# Patient Record
Sex: MALE | Race: WHITE | Employment: OTHER | URBAN - METROPOLITAN AREA
[De-identification: names, ages, dates, MRNs, and addresses within clinical notes are randomized per-mention and may not be internally consistent; named-entity substitution may affect disease eponyms.]

---

## 2022-01-01 ENCOUNTER — APPOINTMENT (OUTPATIENT)
Dept: GENERAL RADIOLOGY | Age: 82
DRG: 870 | End: 2022-01-01
Attending: INTERNAL MEDICINE
Payer: MEDICARE

## 2022-01-01 ENCOUNTER — APPOINTMENT (OUTPATIENT)
Dept: CT IMAGING | Age: 82
DRG: 870 | End: 2022-01-01
Attending: INTERNAL MEDICINE
Payer: MEDICARE

## 2022-01-01 ENCOUNTER — HOSPITAL ENCOUNTER (INPATIENT)
Age: 82
LOS: 9 days | DRG: 870 | End: 2022-08-13
Attending: INTERNAL MEDICINE | Admitting: INTERNAL MEDICINE
Payer: MEDICARE

## 2022-01-01 VITALS
HEIGHT: 69 IN | TEMPERATURE: 99.3 F | DIASTOLIC BLOOD PRESSURE: 46 MMHG | OXYGEN SATURATION: 23 % | BODY MASS INDEX: 21.71 KG/M2 | SYSTOLIC BLOOD PRESSURE: 75 MMHG | WEIGHT: 146.61 LBS

## 2022-01-01 DIAGNOSIS — J18.9 PNEUMONIA DUE TO INFECTIOUS ORGANISM, UNSPECIFIED LATERALITY, UNSPECIFIED PART OF LUNG: ICD-10-CM

## 2022-01-01 LAB
A/G RATIO: 0.9 (ref 1.1–2.2)
A/G RATIO: 0.9 (ref 1.1–2.2)
ABO/RH: NORMAL
ACETAMINOPHEN LEVEL: <5 UG/ML (ref 10–30)
ALBUMIN SERPL-MCNC: 2.1 G/DL (ref 3.4–5)
ALBUMIN SERPL-MCNC: 2.3 G/DL (ref 3.4–5)
ALBUMIN SERPL-MCNC: 2.4 G/DL (ref 3.4–5)
ALBUMIN SERPL-MCNC: 2.6 G/DL (ref 3.4–5)
ALBUMIN SERPL-MCNC: 2.8 G/DL (ref 3.4–5)
ALP BLD-CCNC: 38 U/L (ref 40–129)
ALP BLD-CCNC: 41 U/L (ref 40–129)
ALP BLD-CCNC: 45 U/L (ref 40–129)
ALT SERPL-CCNC: 38 U/L (ref 10–40)
ALT SERPL-CCNC: 39 U/L (ref 10–40)
ALT SERPL-CCNC: 44 U/L (ref 10–40)
ANION GAP SERPL CALCULATED.3IONS-SCNC: 11 MMOL/L (ref 3–16)
ANION GAP SERPL CALCULATED.3IONS-SCNC: 14 MMOL/L (ref 3–16)
ANION GAP SERPL CALCULATED.3IONS-SCNC: 7 MMOL/L (ref 3–16)
ANION GAP SERPL CALCULATED.3IONS-SCNC: 8 MMOL/L (ref 3–16)
ANION GAP SERPL CALCULATED.3IONS-SCNC: 8 MMOL/L (ref 3–16)
ANION GAP SERPL CALCULATED.3IONS-SCNC: 9 MMOL/L (ref 3–16)
ANTIBODY SCREEN: NORMAL
APPEARANCE BAL (LAVAGE): ABNORMAL
APTT: 166.2 SEC (ref 23–34.3)
APTT: 60 SEC (ref 23–34.3)
APTT: 78.2 SEC (ref 23–34.3)
APTT: 82.6 SEC (ref 23–34.3)
APTT: >180 SEC (ref 23–34.3)
AST SERPL-CCNC: 44 U/L (ref 15–37)
AST SERPL-CCNC: 63 U/L (ref 15–37)
AST SERPL-CCNC: 80 U/L (ref 15–37)
BACTERIA: NORMAL /HPF
BASE EXCESS ARTERIAL: -0.4 MMOL/L (ref -3–3)
BASE EXCESS ARTERIAL: -0.9 MMOL/L (ref -3–3)
BASE EXCESS ARTERIAL: -1.6 MMOL/L (ref -3–3)
BASE EXCESS ARTERIAL: -2.1 MMOL/L (ref -3–3)
BASOPHILS ABSOLUTE: 0 K/UL (ref 0–0.2)
BASOPHILS RELATIVE PERCENT: 0.1 %
BASOPHILS RELATIVE PERCENT: 0.2 %
BILIRUB SERPL-MCNC: 0.4 MG/DL (ref 0–1)
BILIRUB SERPL-MCNC: 0.4 MG/DL (ref 0–1)
BILIRUB SERPL-MCNC: 0.6 MG/DL (ref 0–1)
BILIRUBIN DIRECT: <0.2 MG/DL (ref 0–0.3)
BILIRUBIN URINE: NEGATIVE
BILIRUBIN, INDIRECT: ABNORMAL MG/DL (ref 0–1)
BLOOD CULTURE, ROUTINE: NORMAL
BLOOD SMEAR REVIEW: NORMAL
BLOOD, URINE: NEGATIVE
BUN BLDV-MCNC: 31 MG/DL (ref 7–20)
BUN BLDV-MCNC: 34 MG/DL (ref 7–20)
BUN BLDV-MCNC: 37 MG/DL (ref 7–20)
BUN BLDV-MCNC: 37 MG/DL (ref 7–20)
BUN BLDV-MCNC: 39 MG/DL (ref 7–20)
BUN BLDV-MCNC: 44 MG/DL (ref 7–20)
BUN BLDV-MCNC: 44 MG/DL (ref 7–20)
BUN BLDV-MCNC: 48 MG/DL (ref 7–20)
CALCIUM SERPL-MCNC: 7.2 MG/DL (ref 8.3–10.6)
CALCIUM SERPL-MCNC: 7.4 MG/DL (ref 8.3–10.6)
CALCIUM SERPL-MCNC: 7.7 MG/DL (ref 8.3–10.6)
CALCIUM SERPL-MCNC: 7.8 MG/DL (ref 8.3–10.6)
CALCIUM SERPL-MCNC: 7.8 MG/DL (ref 8.3–10.6)
CALCIUM SERPL-MCNC: 7.9 MG/DL (ref 8.3–10.6)
CALCIUM SERPL-MCNC: 8 MG/DL (ref 8.3–10.6)
CALCIUM SERPL-MCNC: 8 MG/DL (ref 8.3–10.6)
CARBOXYHEMOGLOBIN ARTERIAL: 1.2 % (ref 0–1.5)
CARBOXYHEMOGLOBIN ARTERIAL: 1.3 % (ref 0–1.5)
CARBOXYHEMOGLOBIN ARTERIAL: 1.4 % (ref 0–1.5)
CARBOXYHEMOGLOBIN ARTERIAL: 1.5 % (ref 0–1.5)
CHLORIDE BLD-SCNC: 106 MMOL/L (ref 99–110)
CHLORIDE BLD-SCNC: 108 MMOL/L (ref 99–110)
CHLORIDE BLD-SCNC: 109 MMOL/L (ref 99–110)
CHLORIDE BLD-SCNC: 109 MMOL/L (ref 99–110)
CHLORIDE BLD-SCNC: 110 MMOL/L (ref 99–110)
CHLORIDE BLD-SCNC: 111 MMOL/L (ref 99–110)
CLARITY: CLEAR
CLOT EVALUATION BAL: ABNORMAL
CO2: 19 MMOL/L (ref 21–32)
CO2: 21 MMOL/L (ref 21–32)
CO2: 22 MMOL/L (ref 21–32)
CO2: 22 MMOL/L (ref 21–32)
CO2: 23 MMOL/L (ref 21–32)
CO2: 24 MMOL/L (ref 21–32)
CO2: 24 MMOL/L (ref 21–32)
CO2: 25 MMOL/L (ref 21–32)
COLOR LAVAGE: ABNORMAL
COLOR: YELLOW
CREAT SERPL-MCNC: 0.8 MG/DL (ref 0.8–1.3)
CREAT SERPL-MCNC: 0.9 MG/DL (ref 0.8–1.3)
CREAT SERPL-MCNC: 1 MG/DL (ref 0.8–1.3)
CULTURE, BLOOD 2: NORMAL
CULTURE, RESPIRATORY: ABNORMAL
CULTURE, RESPIRATORY: ABNORMAL
CULTURE, RESPIRATORY: NORMAL
EKG ATRIAL RATE: 105 BPM
EKG ATRIAL RATE: 141 BPM
EKG ATRIAL RATE: 70 BPM
EKG DIAGNOSIS: NORMAL
EKG P AXIS: 95 DEGREES
EKG P-R INTERVAL: 224 MS
EKG Q-T INTERVAL: 344 MS
EKG Q-T INTERVAL: 360 MS
EKG Q-T INTERVAL: 400 MS
EKG QRS DURATION: 134 MS
EKG QRS DURATION: 146 MS
EKG QRS DURATION: 146 MS
EKG QTC CALCULATION (BAZETT): 432 MS
EKG QTC CALCULATION (BAZETT): 475 MS
EKG QTC CALCULATION (BAZETT): 489 MS
EKG R AXIS: -22 DEGREES
EKG R AXIS: -31 DEGREES
EKG R AXIS: -33 DEGREES
EKG T AXIS: -11 DEGREES
EKG T AXIS: -12 DEGREES
EKG T AXIS: 42 DEGREES
EKG VENTRICULAR RATE: 111 BPM
EKG VENTRICULAR RATE: 115 BPM
EKG VENTRICULAR RATE: 70 BPM
EOSINOPHILS ABSOLUTE: 0 K/UL (ref 0–0.6)
EOSINOPHILS ABSOLUTE: 0.2 K/UL (ref 0–0.6)
EOSINOPHILS ABSOLUTE: 0.2 K/UL (ref 0–0.6)
EOSINOPHILS ABSOLUTE: 0.3 K/UL (ref 0–0.6)
EOSINOPHILS ABSOLUTE: 0.3 K/UL (ref 0–0.6)
EOSINOPHILS RELATIVE PERCENT: 0 %
EOSINOPHILS RELATIVE PERCENT: 0 %
EOSINOPHILS RELATIVE PERCENT: 0.2 %
EOSINOPHILS RELATIVE PERCENT: 0.4 %
EOSINOPHILS RELATIVE PERCENT: 1.4 %
EOSINOPHILS RELATIVE PERCENT: 2.2 %
EOSINOPHILS RELATIVE PERCENT: 2.7 %
EOSINOPHILS RELATIVE PERCENT: 3.7 %
EPITHELIAL CELLS, UA: 3 /HPF (ref 0–5)
ESTIMATED AVERAGE GLUCOSE: 108.3 MG/DL
ETHANOL: NORMAL MG/DL (ref 0–0.08)
FERRITIN: 293.8 NG/ML (ref 30–400)
FOLATE: 10.99 NG/ML (ref 4.78–24.2)
GFR AFRICAN AMERICAN: >60
GFR NON-AFRICAN AMERICAN: >60
GLUCOSE BLD-MCNC: 101 MG/DL (ref 70–99)
GLUCOSE BLD-MCNC: 102 MG/DL (ref 70–99)
GLUCOSE BLD-MCNC: 102 MG/DL (ref 70–99)
GLUCOSE BLD-MCNC: 111 MG/DL (ref 70–99)
GLUCOSE BLD-MCNC: 113 MG/DL (ref 70–99)
GLUCOSE BLD-MCNC: 113 MG/DL (ref 70–99)
GLUCOSE BLD-MCNC: 114 MG/DL (ref 70–99)
GLUCOSE BLD-MCNC: 116 MG/DL (ref 70–99)
GLUCOSE BLD-MCNC: 118 MG/DL (ref 70–99)
GLUCOSE BLD-MCNC: 118 MG/DL (ref 70–99)
GLUCOSE BLD-MCNC: 120 MG/DL (ref 70–99)
GLUCOSE BLD-MCNC: 121 MG/DL (ref 70–99)
GLUCOSE BLD-MCNC: 125 MG/DL (ref 70–99)
GLUCOSE BLD-MCNC: 126 MG/DL (ref 70–99)
GLUCOSE BLD-MCNC: 128 MG/DL (ref 70–99)
GLUCOSE BLD-MCNC: 131 MG/DL (ref 70–99)
GLUCOSE BLD-MCNC: 132 MG/DL (ref 70–99)
GLUCOSE BLD-MCNC: 133 MG/DL (ref 70–99)
GLUCOSE BLD-MCNC: 133 MG/DL (ref 70–99)
GLUCOSE BLD-MCNC: 136 MG/DL (ref 70–99)
GLUCOSE BLD-MCNC: 137 MG/DL (ref 70–99)
GLUCOSE BLD-MCNC: 144 MG/DL (ref 70–99)
GLUCOSE BLD-MCNC: 145 MG/DL (ref 70–99)
GLUCOSE BLD-MCNC: 146 MG/DL (ref 70–99)
GLUCOSE BLD-MCNC: 146 MG/DL (ref 70–99)
GLUCOSE BLD-MCNC: 148 MG/DL (ref 70–99)
GLUCOSE BLD-MCNC: 150 MG/DL (ref 70–99)
GLUCOSE BLD-MCNC: 151 MG/DL (ref 70–99)
GLUCOSE BLD-MCNC: 156 MG/DL (ref 70–99)
GLUCOSE BLD-MCNC: 158 MG/DL (ref 70–99)
GLUCOSE BLD-MCNC: 164 MG/DL (ref 70–99)
GLUCOSE BLD-MCNC: 165 MG/DL (ref 70–99)
GLUCOSE BLD-MCNC: 165 MG/DL (ref 70–99)
GLUCOSE BLD-MCNC: 167 MG/DL (ref 70–99)
GLUCOSE BLD-MCNC: 170 MG/DL (ref 70–99)
GLUCOSE BLD-MCNC: 172 MG/DL (ref 70–99)
GLUCOSE BLD-MCNC: 173 MG/DL (ref 70–99)
GLUCOSE BLD-MCNC: 179 MG/DL (ref 70–99)
GLUCOSE BLD-MCNC: 85 MG/DL (ref 70–99)
GLUCOSE BLD-MCNC: 85 MG/DL (ref 70–99)
GLUCOSE BLD-MCNC: 92 MG/DL (ref 70–99)
GLUCOSE URINE: NEGATIVE MG/DL
GRAM STAIN RESULT: ABNORMAL
GRAM STAIN RESULT: NORMAL
HAPTOGLOBIN: 47 MG/DL (ref 30–200)
HBA1C MFR BLD: 5.4 %
HCO3 ARTERIAL: 22.8 MMOL/L (ref 21–29)
HCO3 ARTERIAL: 23.1 MMOL/L (ref 21–29)
HCO3 ARTERIAL: 23.8 MMOL/L (ref 21–29)
HCO3 ARTERIAL: 23.8 MMOL/L (ref 21–29)
HCT VFR BLD CALC: 21.2 % (ref 40.5–52.5)
HCT VFR BLD CALC: 21.5 % (ref 40.5–52.5)
HCT VFR BLD CALC: 21.6 % (ref 40.5–52.5)
HCT VFR BLD CALC: 21.7 % (ref 40.5–52.5)
HCT VFR BLD CALC: 22.6 % (ref 40.5–52.5)
HCT VFR BLD CALC: 23 % (ref 40.5–52.5)
HCT VFR BLD CALC: 23.6 % (ref 40.5–52.5)
HCT VFR BLD CALC: 26.8 % (ref 40.5–52.5)
HCT VFR BLD CALC: 27.2 % (ref 40.5–52.5)
HEMATOLOGY PATH CONSULT: NORMAL
HEMOGLOBIN, ART, EXTENDED: 8 G/DL (ref 13.5–17.5)
HEMOGLOBIN, ART, EXTENDED: 8.7 G/DL (ref 13.5–17.5)
HEMOGLOBIN, ART, EXTENDED: 8.7 G/DL (ref 13.5–17.5)
HEMOGLOBIN, ART, EXTENDED: 8.8 G/DL (ref 13.5–17.5)
HEMOGLOBIN: 7.2 G/DL (ref 13.5–17.5)
HEMOGLOBIN: 7.3 G/DL (ref 13.5–17.5)
HEMOGLOBIN: 7.5 G/DL (ref 13.5–17.5)
HEMOGLOBIN: 7.8 G/DL (ref 13.5–17.5)
HEMOGLOBIN: 7.9 G/DL (ref 13.5–17.5)
HEMOGLOBIN: 8.9 G/DL (ref 13.5–17.5)
HYALINE CASTS: 1 /LPF (ref 0–8)
IMMATURE RETIC FRACT: 0.35 (ref 0.21–0.37)
IRON SATURATION: 9 % (ref 20–50)
IRON: 18 UG/DL (ref 59–158)
KETONES, URINE: ABNORMAL MG/DL
LACTATE DEHYDROGENASE: 294 U/L (ref 100–190)
LACTIC ACID: 1.7 MMOL/L (ref 0.4–2)
LEUKOCYTE ESTERASE, URINE: NEGATIVE
LV EF: 38 %
LVEF MODALITY: NORMAL
LYMPHOCYTES ABSOLUTE: 0.1 K/UL (ref 1–5.1)
LYMPHOCYTES ABSOLUTE: 0.3 K/UL (ref 1–5.1)
LYMPHOCYTES ABSOLUTE: 0.4 K/UL (ref 1–5.1)
LYMPHOCYTES RELATIVE PERCENT: 1.9 %
LYMPHOCYTES RELATIVE PERCENT: 2.4 %
LYMPHOCYTES RELATIVE PERCENT: 3.2 %
LYMPHOCYTES RELATIVE PERCENT: 3.3 %
LYMPHOCYTES RELATIVE PERCENT: 3.5 %
LYMPHOCYTES RELATIVE PERCENT: 4 %
LYMPHOCYTES RELATIVE PERCENT: 4.2 %
LYMPHOCYTES RELATIVE PERCENT: 4.3 %
LYMPHOCYTES, BAL: 3 % (ref 5–10)
MACROPHAGES, BAL: 2 % (ref 90–95)
MAGNESIUM: 1.5 MG/DL (ref 1.8–2.4)
MAGNESIUM: 1.8 MG/DL (ref 1.8–2.4)
MAGNESIUM: 2 MG/DL (ref 1.8–2.4)
MAGNESIUM: 2.1 MG/DL (ref 1.8–2.4)
MAGNESIUM: 2.5 MG/DL (ref 1.8–2.4)
MCH RBC QN AUTO: 30.4 PG (ref 26–34)
MCH RBC QN AUTO: 30.5 PG (ref 26–34)
MCH RBC QN AUTO: 30.6 PG (ref 26–34)
MCH RBC QN AUTO: 30.9 PG (ref 26–34)
MCH RBC QN AUTO: 30.9 PG (ref 26–34)
MCH RBC QN AUTO: 31.2 PG (ref 26–34)
MCHC RBC AUTO-ENTMCNC: 32.7 G/DL (ref 31–36)
MCHC RBC AUTO-ENTMCNC: 33.1 G/DL (ref 31–36)
MCHC RBC AUTO-ENTMCNC: 33.2 G/DL (ref 31–36)
MCHC RBC AUTO-ENTMCNC: 33.4 G/DL (ref 31–36)
MCHC RBC AUTO-ENTMCNC: 33.5 G/DL (ref 31–36)
MCHC RBC AUTO-ENTMCNC: 33.7 G/DL (ref 31–36)
MCHC RBC AUTO-ENTMCNC: 33.8 G/DL (ref 31–36)
MCHC RBC AUTO-ENTMCNC: 34.1 G/DL (ref 31–36)
MCV RBC AUTO: 91.3 FL (ref 80–100)
MCV RBC AUTO: 91.4 FL (ref 80–100)
MCV RBC AUTO: 91.5 FL (ref 80–100)
MCV RBC AUTO: 91.7 FL (ref 80–100)
MCV RBC AUTO: 92.3 FL (ref 80–100)
MCV RBC AUTO: 93.5 FL (ref 80–100)
METHEMOGLOBIN ARTERIAL: 0.6 %
METHEMOGLOBIN ARTERIAL: 0.6 %
METHEMOGLOBIN ARTERIAL: 0.7 %
METHEMOGLOBIN ARTERIAL: 0.9 %
MICROSCOPIC EXAMINATION: YES
MONOCYTES ABSOLUTE: 0.2 K/UL (ref 0–1.3)
MONOCYTES ABSOLUTE: 0.6 K/UL (ref 0–1.3)
MONOCYTES ABSOLUTE: 0.8 K/UL (ref 0–1.3)
MONOCYTES ABSOLUTE: 0.9 K/UL (ref 0–1.3)
MONOCYTES ABSOLUTE: 1 K/UL (ref 0–1.3)
MONOCYTES ABSOLUTE: 1 K/UL (ref 0–1.3)
MONOCYTES ABSOLUTE: 1.1 K/UL (ref 0–1.3)
MONOCYTES ABSOLUTE: 1.4 K/UL (ref 0–1.3)
MONOCYTES RELATIVE PERCENT: 11 %
MONOCYTES RELATIVE PERCENT: 15.1 %
MONOCYTES RELATIVE PERCENT: 5.9 %
MONOCYTES RELATIVE PERCENT: 6.9 %
MONOCYTES RELATIVE PERCENT: 7 %
MONOCYTES RELATIVE PERCENT: 7.6 %
MONOCYTES RELATIVE PERCENT: 8.8 %
MONOCYTES RELATIVE PERCENT: 8.9 %
MRSA SCREEN RT-PCR: NORMAL
NEUTROPHILS ABSOLUTE: 10.1 K/UL (ref 1.7–7.7)
NEUTROPHILS ABSOLUTE: 10.5 K/UL (ref 1.7–7.7)
NEUTROPHILS ABSOLUTE: 12 K/UL (ref 1.7–7.7)
NEUTROPHILS ABSOLUTE: 2.9 K/UL (ref 1.7–7.7)
NEUTROPHILS ABSOLUTE: 7 K/UL (ref 1.7–7.7)
NEUTROPHILS ABSOLUTE: 7.7 K/UL (ref 1.7–7.7)
NEUTROPHILS ABSOLUTE: 8 K/UL (ref 1.7–7.7)
NEUTROPHILS ABSOLUTE: 9.1 K/UL (ref 1.7–7.7)
NEUTROPHILS RELATIVE PERCENT: 76.9 %
NEUTROPHILS RELATIVE PERCENT: 82.4 %
NEUTROPHILS RELATIVE PERCENT: 85.8 %
NEUTROPHILS RELATIVE PERCENT: 87.1 %
NEUTROPHILS RELATIVE PERCENT: 88.8 %
NEUTROPHILS RELATIVE PERCENT: 88.9 %
NEUTROPHILS RELATIVE PERCENT: 89.6 %
NEUTROPHILS RELATIVE PERCENT: 90.8 %
NITRITE, URINE: NEGATIVE
NUMBER OF CELLS COUNTED BAL (LAVAGE): 100
O2 SAT, ARTERIAL: 93.3 %
O2 SAT, ARTERIAL: 93.7 %
O2 SAT, ARTERIAL: 98.1 %
O2 SAT, ARTERIAL: 98.8 %
O2 THERAPY: ABNORMAL
ORGANISM: ABNORMAL
PCO2 ARTERIAL: 36.1 MMHG (ref 35–45)
PCO2 ARTERIAL: 37.8 MMHG (ref 35–45)
PCO2 ARTERIAL: 38.6 MMHG (ref 35–45)
PCO2 ARTERIAL: 38.7 MMHG (ref 35–45)
PDW BLD-RTO: 15.2 % (ref 12.4–15.4)
PDW BLD-RTO: 15.3 % (ref 12.4–15.4)
PDW BLD-RTO: 15.5 % (ref 12.4–15.4)
PDW BLD-RTO: 15.7 % (ref 12.4–15.4)
PDW BLD-RTO: 15.7 % (ref 12.4–15.4)
PDW BLD-RTO: 15.8 % (ref 12.4–15.4)
PDW BLD-RTO: 16 % (ref 12.4–15.4)
PDW BLD-RTO: 16 % (ref 12.4–15.4)
PERFORMED ON: ABNORMAL
PERFORMED ON: NORMAL
PERFORMED ON: NORMAL
PH ARTERIAL: 7.38 (ref 7.35–7.45)
PH ARTERIAL: 7.39 (ref 7.35–7.45)
PH ARTERIAL: 7.4 (ref 7.35–7.45)
PH ARTERIAL: 7.43 (ref 7.35–7.45)
PH UA: 5.5 (ref 5–8)
PHOSPHORUS: 1.5 MG/DL (ref 2.5–4.9)
PHOSPHORUS: 1.5 MG/DL (ref 2.5–4.9)
PHOSPHORUS: 1.9 MG/DL (ref 2.5–4.9)
PHOSPHORUS: 2.1 MG/DL (ref 2.5–4.9)
PHOSPHORUS: 2.2 MG/DL (ref 2.5–4.9)
PHOSPHORUS: 2.2 MG/DL (ref 2.5–4.9)
PHOSPHORUS: 2.3 MG/DL (ref 2.5–4.9)
PHOSPHORUS: 2.5 MG/DL (ref 2.5–4.9)
PHOSPHORUS: 2.9 MG/DL (ref 2.5–4.9)
PHOSPHORUS: 2.9 MG/DL (ref 2.5–4.9)
PHOSPHORUS: 3 MG/DL (ref 2.5–4.9)
PLATELET # BLD: 114 K/UL (ref 135–450)
PLATELET # BLD: 124 K/UL (ref 135–450)
PLATELET # BLD: 128 K/UL (ref 135–450)
PLATELET # BLD: 142 K/UL (ref 135–450)
PLATELET # BLD: 146 K/UL (ref 135–450)
PLATELET # BLD: 162 K/UL (ref 135–450)
PLATELET # BLD: 174 K/UL (ref 135–450)
PLATELET # BLD: 178 K/UL (ref 135–450)
PMV BLD AUTO: 8.6 FL (ref 5–10.5)
PMV BLD AUTO: 8.6 FL (ref 5–10.5)
PMV BLD AUTO: 8.8 FL (ref 5–10.5)
PMV BLD AUTO: 9.1 FL (ref 5–10.5)
PMV BLD AUTO: 9.1 FL (ref 5–10.5)
PMV BLD AUTO: 9.2 FL (ref 5–10.5)
PO2 ARTERIAL: 104 MMHG (ref 75–108)
PO2 ARTERIAL: 63.5 MMHG (ref 75–108)
PO2 ARTERIAL: 63.8 MMHG (ref 75–108)
PO2 ARTERIAL: 90.7 MMHG (ref 75–108)
POTASSIUM REFLEX MAGNESIUM: 3.6 MMOL/L (ref 3.5–5.1)
POTASSIUM REFLEX MAGNESIUM: 4 MMOL/L (ref 3.5–5.1)
POTASSIUM SERPL-SCNC: 3.1 MMOL/L (ref 3.5–5.1)
POTASSIUM SERPL-SCNC: 3.5 MMOL/L (ref 3.5–5.1)
POTASSIUM SERPL-SCNC: 3.8 MMOL/L (ref 3.5–5.1)
POTASSIUM SERPL-SCNC: 4 MMOL/L (ref 3.5–5.1)
POTASSIUM SERPL-SCNC: 4.1 MMOL/L (ref 3.5–5.1)
PROCALCITONIN: 1.41 NG/ML (ref 0–0.15)
PROCALCITONIN: 2.52 NG/ML (ref 0–0.15)
PROTEIN UA: 30 MG/DL
RBC # BLD: 2.32 M/UL (ref 4.2–5.9)
RBC # BLD: 2.35 M/UL (ref 4.2–5.9)
RBC # BLD: 2.35 M/UL (ref 4.2–5.9)
RBC # BLD: 2.36 M/UL (ref 4.2–5.9)
RBC # BLD: 2.47 M/UL (ref 4.2–5.9)
RBC # BLD: 2.52 M/UL (ref 4.2–5.9)
RBC # BLD: 2.58 M/UL (ref 4.2–5.9)
RBC # BLD: 2.9 M/UL (ref 4.2–5.9)
RBC UA: 4 /HPF (ref 0–4)
RBC, BAL: ABNORMAL /CUMM
RETICULOCYTE ABSOLUTE COUNT: 0.04 M/UL
RETICULOCYTE COUNT PCT: 1.23 % (ref 0.5–2.18)
SEGMENTED NEUTROPHILS, BAL: 95 % (ref 5–10)
SODIUM BLD-SCNC: 137 MMOL/L (ref 136–145)
SODIUM BLD-SCNC: 139 MMOL/L (ref 136–145)
SODIUM BLD-SCNC: 139 MMOL/L (ref 136–145)
SODIUM BLD-SCNC: 140 MMOL/L (ref 136–145)
SODIUM BLD-SCNC: 141 MMOL/L (ref 136–145)
SODIUM BLD-SCNC: 142 MMOL/L (ref 136–145)
SODIUM BLD-SCNC: 142 MMOL/L (ref 136–145)
SODIUM BLD-SCNC: 144 MMOL/L (ref 136–145)
SPECIFIC GRAVITY UA: 1.06 (ref 1–1.03)
TCO2 ARTERIAL: 24 MMOL/L
TCO2 ARTERIAL: 24.2 MMOL/L
TCO2 ARTERIAL: 24.9 MMOL/L
TCO2 ARTERIAL: 25 MMOL/L
TOTAL IRON BINDING CAPACITY: 207 UG/DL (ref 260–445)
TOTAL PROTEIN: 5 G/DL (ref 6.4–8.2)
TOTAL PROTEIN: 5.2 G/DL (ref 6.4–8.2)
TOTAL PROTEIN: 5.4 G/DL (ref 6.4–8.2)
TROPONIN: 0.67 NG/ML
TROPONIN: 0.68 NG/ML
TSH REFLEX: 0.79 UIU/ML (ref 0.27–4.2)
URINE CULTURE, ROUTINE: NORMAL
URINE TYPE: ABNORMAL
UROBILINOGEN, URINE: 0.2 E.U./DL
VANCOMYCIN RANDOM: 11.1 UG/ML
VANCOMYCIN RANDOM: 8.6 UG/ML
VITAMIN B-12: 382 PG/ML (ref 211–911)
VOLUME LAVAGE: 24 ML
WBC # BLD: 10.2 K/UL (ref 4–11)
WBC # BLD: 11.6 K/UL (ref 4–11)
WBC # BLD: 12.3 K/UL (ref 4–11)
WBC # BLD: 13.4 K/UL (ref 4–11)
WBC # BLD: 3.2 K/UL (ref 4–11)
WBC # BLD: 9 K/UL (ref 4–11)
WBC # BLD: 9.1 K/UL (ref 4–11)
WBC # BLD: 9.4 K/UL (ref 4–11)
WBC UA: 2 /HPF (ref 0–5)
WBC/EPI CELLS BAL: 7100 /CUMM

## 2022-01-01 PROCEDURE — 2500000003 HC RX 250 WO HCPCS: Performed by: STUDENT IN AN ORGANIZED HEALTH CARE EDUCATION/TRAINING PROGRAM

## 2022-01-01 PROCEDURE — 0BC38ZZ EXTIRPATION OF MATTER FROM RIGHT MAIN BRONCHUS, VIA NATURAL OR ARTIFICIAL OPENING ENDOSCOPIC: ICD-10-PCS | Performed by: INTERNAL MEDICINE

## 2022-01-01 PROCEDURE — 94761 N-INVAS EAR/PLS OXIMETRY MLT: CPT

## 2022-01-01 PROCEDURE — 80143 DRUG ASSAY ACETAMINOPHEN: CPT

## 2022-01-01 PROCEDURE — 5A1955Z RESPIRATORY VENTILATION, GREATER THAN 96 CONSECUTIVE HOURS: ICD-10-PCS | Performed by: INTERNAL MEDICINE

## 2022-01-01 PROCEDURE — 88305 TISSUE EXAM BY PATHOLOGIST: CPT

## 2022-01-01 PROCEDURE — 2700000000 HC OXYGEN THERAPY PER DAY

## 2022-01-01 PROCEDURE — 2500000003 HC RX 250 WO HCPCS: Performed by: NURSE PRACTITIONER

## 2022-01-01 PROCEDURE — 83540 ASSAY OF IRON: CPT

## 2022-01-01 PROCEDURE — 6370000000 HC RX 637 (ALT 250 FOR IP): Performed by: INTERNAL MEDICINE

## 2022-01-01 PROCEDURE — 82077 ASSAY SPEC XCP UR&BREATH IA: CPT

## 2022-01-01 PROCEDURE — 82728 ASSAY OF FERRITIN: CPT

## 2022-01-01 PROCEDURE — 84132 ASSAY OF SERUM POTASSIUM: CPT

## 2022-01-01 PROCEDURE — 87077 CULTURE AEROBIC IDENTIFY: CPT

## 2022-01-01 PROCEDURE — 87070 CULTURE OTHR SPECIMN AEROBIC: CPT

## 2022-01-01 PROCEDURE — 36592 COLLECT BLOOD FROM PICC: CPT

## 2022-01-01 PROCEDURE — 83735 ASSAY OF MAGNESIUM: CPT

## 2022-01-01 PROCEDURE — 0B9F8ZX DRAINAGE OF RIGHT LOWER LUNG LOBE, VIA NATURAL OR ARTIFICIAL OPENING ENDOSCOPIC, DIAGNOSTIC: ICD-10-PCS | Performed by: INTERNAL MEDICINE

## 2022-01-01 PROCEDURE — 71045 X-RAY EXAM CHEST 1 VIEW: CPT

## 2022-01-01 PROCEDURE — 93306 TTE W/DOPPLER COMPLETE: CPT

## 2022-01-01 PROCEDURE — 80202 ASSAY OF VANCOMYCIN: CPT

## 2022-01-01 PROCEDURE — 85045 AUTOMATED RETICULOCYTE COUNT: CPT

## 2022-01-01 PROCEDURE — 80069 RENAL FUNCTION PANEL: CPT

## 2022-01-01 PROCEDURE — 6370000000 HC RX 637 (ALT 250 FOR IP): Performed by: NURSE PRACTITIONER

## 2022-01-01 PROCEDURE — 99291 CRITICAL CARE FIRST HOUR: CPT | Performed by: INTERNAL MEDICINE

## 2022-01-01 PROCEDURE — 93010 ELECTROCARDIOGRAM REPORT: CPT | Performed by: INTERNAL MEDICINE

## 2022-01-01 PROCEDURE — 31622 DX BRONCHOSCOPE/WASH: CPT | Performed by: INTERNAL MEDICINE

## 2022-01-01 PROCEDURE — 2000000000 HC ICU R&B

## 2022-01-01 PROCEDURE — 84100 ASSAY OF PHOSPHORUS: CPT

## 2022-01-01 PROCEDURE — 6360000002 HC RX W HCPCS: Performed by: INTERNAL MEDICINE

## 2022-01-01 PROCEDURE — 2580000003 HC RX 258: Performed by: INTERNAL MEDICINE

## 2022-01-01 PROCEDURE — 0BJ08ZZ INSPECTION OF TRACHEOBRONCHIAL TREE, VIA NATURAL OR ARTIFICIAL OPENING ENDOSCOPIC: ICD-10-PCS | Performed by: INTERNAL MEDICINE

## 2022-01-01 PROCEDURE — 70450 CT HEAD/BRAIN W/O DYE: CPT

## 2022-01-01 PROCEDURE — C9113 INJ PANTOPRAZOLE SODIUM, VIA: HCPCS | Performed by: INTERNAL MEDICINE

## 2022-01-01 PROCEDURE — 87641 MR-STAPH DNA AMP PROBE: CPT

## 2022-01-01 PROCEDURE — 2580000003 HC RX 258: Performed by: NURSE PRACTITIONER

## 2022-01-01 PROCEDURE — 86900 BLOOD TYPING SEROLOGIC ABO: CPT

## 2022-01-01 PROCEDURE — 83036 HEMOGLOBIN GLYCOSYLATED A1C: CPT

## 2022-01-01 PROCEDURE — 6370000000 HC RX 637 (ALT 250 FOR IP): Performed by: STUDENT IN AN ORGANIZED HEALTH CARE EDUCATION/TRAINING PROGRAM

## 2022-01-01 PROCEDURE — 89051 BODY FLUID CELL COUNT: CPT

## 2022-01-01 PROCEDURE — 82803 BLOOD GASES ANY COMBINATION: CPT

## 2022-01-01 PROCEDURE — 94003 VENT MGMT INPAT SUBQ DAY: CPT

## 2022-01-01 PROCEDURE — 0BC58ZZ EXTIRPATION OF MATTER FROM RIGHT MIDDLE LOBE BRONCHUS, VIA NATURAL OR ARTIFICIAL OPENING ENDOSCOPIC: ICD-10-PCS | Performed by: INTERNAL MEDICINE

## 2022-01-01 PROCEDURE — 2500000003 HC RX 250 WO HCPCS: Performed by: INTERNAL MEDICINE

## 2022-01-01 PROCEDURE — 84145 PROCALCITONIN (PCT): CPT

## 2022-01-01 PROCEDURE — 85025 COMPLETE CBC W/AUTO DIFF WBC: CPT

## 2022-01-01 PROCEDURE — 1200000000 HC SEMI PRIVATE

## 2022-01-01 PROCEDURE — 88312 SPECIAL STAINS GROUP 1: CPT

## 2022-01-01 PROCEDURE — 6360000002 HC RX W HCPCS: Performed by: NURSE PRACTITIONER

## 2022-01-01 PROCEDURE — 93005 ELECTROCARDIOGRAM TRACING: CPT | Performed by: INTERNAL MEDICINE

## 2022-01-01 PROCEDURE — 94002 VENT MGMT INPAT INIT DAY: CPT

## 2022-01-01 PROCEDURE — 87181 SC STD AGAR DILUTION PER AGT: CPT

## 2022-01-01 PROCEDURE — 82746 ASSAY OF FOLIC ACID SERUM: CPT

## 2022-01-01 PROCEDURE — 80076 HEPATIC FUNCTION PANEL: CPT

## 2022-01-01 PROCEDURE — APPNB15 APP NON BILLABLE TIME 0-15 MINS: Performed by: NURSE PRACTITIONER

## 2022-01-01 PROCEDURE — 83605 ASSAY OF LACTIC ACID: CPT

## 2022-01-01 PROCEDURE — 87040 BLOOD CULTURE FOR BACTERIA: CPT

## 2022-01-01 PROCEDURE — 88112 CYTOPATH CELL ENHANCE TECH: CPT

## 2022-01-01 PROCEDURE — 31622 DX BRONCHOSCOPE/WASH: CPT

## 2022-01-01 PROCEDURE — 36415 COLL VENOUS BLD VENIPUNCTURE: CPT

## 2022-01-01 PROCEDURE — 86901 BLOOD TYPING SEROLOGIC RH(D): CPT

## 2022-01-01 PROCEDURE — 80048 BASIC METABOLIC PNL TOTAL CA: CPT

## 2022-01-01 PROCEDURE — 82607 VITAMIN B-12: CPT

## 2022-01-01 PROCEDURE — 80053 COMPREHEN METABOLIC PANEL: CPT

## 2022-01-01 PROCEDURE — 85730 THROMBOPLASTIN TIME PARTIAL: CPT

## 2022-01-01 PROCEDURE — 36600 WITHDRAWAL OF ARTERIAL BLOOD: CPT

## 2022-01-01 PROCEDURE — 84443 ASSAY THYROID STIM HORMONE: CPT

## 2022-01-01 PROCEDURE — 2709999900 HC NON-CHARGEABLE SUPPLY: Performed by: INTERNAL MEDICINE

## 2022-01-01 PROCEDURE — 76937 US GUIDE VASCULAR ACCESS: CPT

## 2022-01-01 PROCEDURE — 84484 ASSAY OF TROPONIN QUANT: CPT

## 2022-01-01 PROCEDURE — 87102 FUNGUS ISOLATION CULTURE: CPT

## 2022-01-01 PROCEDURE — 87186 SC STD MICRODIL/AGAR DIL: CPT

## 2022-01-01 PROCEDURE — 6360000002 HC RX W HCPCS: Performed by: STUDENT IN AN ORGANIZED HEALTH CARE EDUCATION/TRAINING PROGRAM

## 2022-01-01 PROCEDURE — 87205 SMEAR GRAM STAIN: CPT

## 2022-01-01 PROCEDURE — 87086 URINE CULTURE/COLONY COUNT: CPT

## 2022-01-01 PROCEDURE — 99152 MOD SED SAME PHYS/QHP 5/>YRS: CPT | Performed by: INTERNAL MEDICINE

## 2022-01-01 PROCEDURE — 81001 URINALYSIS AUTO W/SCOPE: CPT

## 2022-01-01 PROCEDURE — 86850 RBC ANTIBODY SCREEN: CPT

## 2022-01-01 PROCEDURE — 6360000002 HC RX W HCPCS

## 2022-01-01 PROCEDURE — 0BC68ZZ EXTIRPATION OF MATTER FROM RIGHT LOWER LOBE BRONCHUS, VIA NATURAL OR ARTIFICIAL OPENING ENDOSCOPIC: ICD-10-PCS | Performed by: INTERNAL MEDICINE

## 2022-01-01 PROCEDURE — 3609027000 HC BRONCHOSCOPY: Performed by: INTERNAL MEDICINE

## 2022-01-01 PROCEDURE — 83010 ASSAY OF HAPTOGLOBIN QUANT: CPT

## 2022-01-01 PROCEDURE — 99222 1ST HOSP IP/OBS MODERATE 55: CPT | Performed by: STUDENT IN AN ORGANIZED HEALTH CARE EDUCATION/TRAINING PROGRAM

## 2022-01-01 PROCEDURE — 36569 INSJ PICC 5 YR+ W/O IMAGING: CPT

## 2022-01-01 PROCEDURE — 31624 DX BRONCHOSCOPE/LAVAGE: CPT | Performed by: INTERNAL MEDICINE

## 2022-01-01 PROCEDURE — 83550 IRON BINDING TEST: CPT

## 2022-01-01 PROCEDURE — 94640 AIRWAY INHALATION TREATMENT: CPT

## 2022-01-01 PROCEDURE — 83615 LACTATE (LD) (LDH) ENZYME: CPT

## 2022-01-01 PROCEDURE — C1751 CATH, INF, PER/CENT/MIDLINE: HCPCS

## 2022-01-01 RX ORDER — LEVETIRACETAM 5 MG/ML
500 INJECTION INTRAVASCULAR EVERY 12 HOURS
Status: DISCONTINUED | OUTPATIENT
Start: 2022-01-01 | End: 2022-01-01

## 2022-01-01 RX ORDER — HEPARIN SODIUM 1000 [USP'U]/ML
30 INJECTION, SOLUTION INTRAVENOUS; SUBCUTANEOUS PRN
Status: DISCONTINUED | OUTPATIENT
Start: 2022-01-01 | End: 2022-01-01

## 2022-01-01 RX ORDER — FENTANYL CITRATE-0.9 % NACL/PF 10 MCG/ML
25-200 PLASTIC BAG, INJECTION (ML) INTRAVENOUS CONTINUOUS
Status: DISCONTINUED | OUTPATIENT
Start: 2022-01-01 | End: 2022-01-01

## 2022-01-01 RX ORDER — FERROUS SULFATE TAB EC 324 MG (65 MG FE EQUIVALENT) 324 (65 FE) MG
324 TABLET DELAYED RESPONSE ORAL DAILY
COMMUNITY

## 2022-01-01 RX ORDER — LIDOCAINE HYDROCHLORIDE 10 MG/ML
INJECTION, SOLUTION EPIDURAL; INFILTRATION; INTRACAUDAL; PERINEURAL
Status: DISPENSED
Start: 2022-01-01 | End: 2022-01-01

## 2022-01-01 RX ORDER — LORAZEPAM 2 MG/ML
2 INJECTION INTRAMUSCULAR EVERY 4 HOURS PRN
Status: DISCONTINUED | OUTPATIENT
Start: 2022-01-01 | End: 2022-08-14 | Stop reason: HOSPADM

## 2022-01-01 RX ORDER — FENTANYL CITRATE-0.9 % NACL/PF 20 MCG/2ML
50 SYRINGE (ML) INTRAVENOUS EVERY 30 MIN PRN
Status: DISCONTINUED | OUTPATIENT
Start: 2022-01-01 | End: 2022-01-01

## 2022-01-01 RX ORDER — HEPARIN SODIUM 1000 [USP'U]/ML
3740 INJECTION, SOLUTION INTRAVENOUS; SUBCUTANEOUS ONCE
Status: COMPLETED | OUTPATIENT
Start: 2022-01-01 | End: 2022-01-01

## 2022-01-01 RX ORDER — DIGOXIN 0.25 MG/ML
250 INJECTION INTRAMUSCULAR; INTRAVENOUS ONCE
Status: COMPLETED | OUTPATIENT
Start: 2022-01-01 | End: 2022-01-01

## 2022-01-01 RX ORDER — SODIUM CHLORIDE 9 MG/ML
INJECTION, SOLUTION INTRAVENOUS PRN
Status: DISCONTINUED | OUTPATIENT
Start: 2022-01-01 | End: 2022-01-01 | Stop reason: SDUPTHER

## 2022-01-01 RX ORDER — DILTIAZEM HYDROCHLORIDE 5 MG/ML
2.5 INJECTION INTRAVENOUS ONCE
Status: DISCONTINUED | OUTPATIENT
Start: 2022-01-01 | End: 2022-01-01

## 2022-01-01 RX ORDER — MORPHINE SULFATE 2 MG/ML
2 INJECTION, SOLUTION INTRAMUSCULAR; INTRAVENOUS EVERY 4 HOURS PRN
Status: DISCONTINUED | OUTPATIENT
Start: 2022-01-01 | End: 2022-01-01

## 2022-01-01 RX ORDER — ENOXAPARIN SODIUM 100 MG/ML
40 INJECTION SUBCUTANEOUS DAILY
Status: DISCONTINUED | OUTPATIENT
Start: 2022-01-01 | End: 2022-01-01

## 2022-01-01 RX ORDER — LIDOCAINE HYDROCHLORIDE 10 MG/ML
5 INJECTION, SOLUTION EPIDURAL; INFILTRATION; INTRACAUDAL; PERINEURAL ONCE
Status: DISCONTINUED | OUTPATIENT
Start: 2022-01-01 | End: 2022-01-01

## 2022-01-01 RX ORDER — ASPIRIN 300 MG/1
300 SUPPOSITORY RECTAL ONCE
Status: COMPLETED | OUTPATIENT
Start: 2022-01-01 | End: 2022-01-01

## 2022-01-01 RX ORDER — MORPHINE SULFATE 2 MG/ML
2 INJECTION, SOLUTION INTRAMUSCULAR; INTRAVENOUS
Status: DISCONTINUED | OUTPATIENT
Start: 2022-01-01 | End: 2022-08-14 | Stop reason: HOSPADM

## 2022-01-01 RX ORDER — HEPARIN SODIUM 1000 [USP'U]/ML
60 INJECTION, SOLUTION INTRAVENOUS; SUBCUTANEOUS ONCE
Status: DISCONTINUED | OUTPATIENT
Start: 2022-01-01 | End: 2022-01-01

## 2022-01-01 RX ORDER — FLUTICASONE PROPIONATE 50 MCG
1 SPRAY, SUSPENSION (ML) NASAL DAILY
COMMUNITY

## 2022-01-01 RX ORDER — FENTANYL CITRATE 50 UG/ML
INJECTION, SOLUTION INTRAMUSCULAR; INTRAVENOUS
Status: COMPLETED
Start: 2022-01-01 | End: 2022-01-01

## 2022-01-01 RX ORDER — HALOPERIDOL 1 MG/1
0.5 TABLET ORAL NIGHTLY
Status: DISCONTINUED | OUTPATIENT
Start: 2022-01-01 | End: 2022-01-01

## 2022-01-01 RX ORDER — MAGNESIUM SULFATE IN WATER 40 MG/ML
2000 INJECTION, SOLUTION INTRAVENOUS ONCE
Status: COMPLETED | OUTPATIENT
Start: 2022-01-01 | End: 2022-01-01

## 2022-01-01 RX ORDER — QUETIAPINE FUMARATE 25 MG/1
50 TABLET, FILM COATED ORAL NIGHTLY
Status: DISCONTINUED | OUTPATIENT
Start: 2022-01-01 | End: 2022-01-01

## 2022-01-01 RX ORDER — CALCIUM GLUCONATE 20 MG/ML
1000 INJECTION, SOLUTION INTRAVENOUS ONCE
Status: COMPLETED | OUTPATIENT
Start: 2022-01-01 | End: 2022-01-01

## 2022-01-01 RX ORDER — ATORVASTATIN CALCIUM 40 MG/1
40 TABLET, FILM COATED ORAL DAILY
Status: DISCONTINUED | OUTPATIENT
Start: 2022-01-01 | End: 2022-01-01

## 2022-01-01 RX ORDER — MIDAZOLAM HYDROCHLORIDE 1 MG/ML
INJECTION INTRAMUSCULAR; INTRAVENOUS
Status: COMPLETED
Start: 2022-01-01 | End: 2022-01-01

## 2022-01-01 RX ORDER — SCOLOPAMINE TRANSDERMAL SYSTEM 1 MG/1
1 PATCH, EXTENDED RELEASE TRANSDERMAL
Status: DISCONTINUED | OUTPATIENT
Start: 2022-01-01 | End: 2022-01-01

## 2022-01-01 RX ORDER — MORPHINE SULFATE 2 MG/ML
2 INJECTION, SOLUTION INTRAMUSCULAR; INTRAVENOUS
Status: DISCONTINUED | OUTPATIENT
Start: 2022-01-01 | End: 2022-01-01

## 2022-01-01 RX ORDER — CALCIUM GLUCONATE 20 MG/ML
1000 INJECTION, SOLUTION INTRAVENOUS
Status: COMPLETED | OUTPATIENT
Start: 2022-01-01 | End: 2022-01-01

## 2022-01-01 RX ORDER — ACETYLCYSTEINE 200 MG/ML
600 SOLUTION ORAL; RESPIRATORY (INHALATION) 2 TIMES DAILY
Status: DISCONTINUED | OUTPATIENT
Start: 2022-01-01 | End: 2022-01-01

## 2022-01-01 RX ORDER — FOLIC ACID 1 MG/1
1 TABLET ORAL DAILY
Status: DISCONTINUED | OUTPATIENT
Start: 2022-01-01 | End: 2022-01-01

## 2022-01-01 RX ORDER — DEXMEDETOMIDINE HYDROCHLORIDE 4 UG/ML
.1-1.5 INJECTION, SOLUTION INTRAVENOUS CONTINUOUS
Status: DISCONTINUED | OUTPATIENT
Start: 2022-01-01 | End: 2022-01-01

## 2022-01-01 RX ORDER — PROPOFOL 10 MG/ML
120 INJECTION, EMULSION INTRAVENOUS ONCE
Status: DISCONTINUED | OUTPATIENT
Start: 2022-01-01 | End: 2022-01-01

## 2022-01-01 RX ORDER — FINASTERIDE 5 MG/1
5 TABLET, FILM COATED ORAL DAILY
COMMUNITY

## 2022-01-01 RX ORDER — MAGNESIUM SULFATE IN WATER 40 MG/ML
2000 INJECTION, SOLUTION INTRAVENOUS PRN
Status: DISCONTINUED | OUTPATIENT
Start: 2022-01-01 | End: 2022-08-14 | Stop reason: HOSPADM

## 2022-01-01 RX ORDER — LACTOBACILLUS RHAMNOSUS GG 10B CELL
1 CAPSULE ORAL 2 TIMES DAILY WITH MEALS
Status: DISCONTINUED | OUTPATIENT
Start: 2022-01-01 | End: 2022-01-01

## 2022-01-01 RX ORDER — ACETAMINOPHEN 500 MG
500 TABLET ORAL DAILY PRN
COMMUNITY

## 2022-01-01 RX ORDER — TAMSULOSIN HYDROCHLORIDE 0.4 MG/1
0.8 CAPSULE ORAL DAILY
Status: DISCONTINUED | OUTPATIENT
Start: 2022-01-01 | End: 2022-01-01

## 2022-01-01 RX ORDER — FENTANYL CITRATE 50 UG/ML
50 INJECTION, SOLUTION INTRAMUSCULAR; INTRAVENOUS
Status: DISCONTINUED | OUTPATIENT
Start: 2022-01-01 | End: 2022-08-14 | Stop reason: HOSPADM

## 2022-01-01 RX ORDER — CHLORHEXIDINE GLUCONATE 0.12 MG/ML
15 RINSE ORAL 2 TIMES DAILY
Status: DISCONTINUED | OUTPATIENT
Start: 2022-01-01 | End: 2022-01-01

## 2022-01-01 RX ORDER — MEMANTINE HYDROCHLORIDE 5 MG/1
10 TABLET ORAL 2 TIMES DAILY
Status: DISCONTINUED | OUTPATIENT
Start: 2022-01-01 | End: 2022-01-01

## 2022-01-01 RX ORDER — SCOLOPAMINE TRANSDERMAL SYSTEM 1 MG/1
1 PATCH, EXTENDED RELEASE TRANSDERMAL
Status: DISCONTINUED | OUTPATIENT
Start: 2022-01-01 | End: 2022-08-14 | Stop reason: HOSPADM

## 2022-01-01 RX ORDER — CALCIUM GLUCONATE 20 MG/ML
1000 INJECTION, SOLUTION INTRAVENOUS ONCE
Status: DISCONTINUED | OUTPATIENT
Start: 2022-01-01 | End: 2022-01-01

## 2022-01-01 RX ORDER — ONDANSETRON 2 MG/ML
4 INJECTION INTRAMUSCULAR; INTRAVENOUS EVERY 6 HOURS PRN
Status: DISCONTINUED | OUTPATIENT
Start: 2022-01-01 | End: 2022-08-14 | Stop reason: HOSPADM

## 2022-01-01 RX ORDER — 0.9 % SODIUM CHLORIDE 0.9 %
1000 INTRAVENOUS SOLUTION INTRAVENOUS ONCE
Status: COMPLETED | OUTPATIENT
Start: 2022-01-01 | End: 2022-01-01

## 2022-01-01 RX ORDER — METOPROLOL SUCCINATE 25 MG/1
12.5 TABLET, EXTENDED RELEASE ORAL NIGHTLY
COMMUNITY

## 2022-01-01 RX ORDER — ASPIRIN 81 MG/1
81 TABLET ORAL DAILY
Status: DISCONTINUED | OUTPATIENT
Start: 2022-01-01 | End: 2022-01-01

## 2022-01-01 RX ORDER — METHYLPREDNISOLONE SODIUM SUCCINATE 40 MG/ML
40 INJECTION, POWDER, LYOPHILIZED, FOR SOLUTION INTRAMUSCULAR; INTRAVENOUS DAILY
Status: DISCONTINUED | OUTPATIENT
Start: 2022-01-01 | End: 2022-01-01

## 2022-01-01 RX ORDER — PROPOFOL 10 MG/ML
200 INJECTION, EMULSION INTRAVENOUS ONCE
Status: COMPLETED | OUTPATIENT
Start: 2022-01-01 | End: 2022-01-01

## 2022-01-01 RX ORDER — FUROSEMIDE 10 MG/ML
40 INJECTION INTRAMUSCULAR; INTRAVENOUS ONCE
Status: COMPLETED | OUTPATIENT
Start: 2022-01-01 | End: 2022-01-01

## 2022-01-01 RX ORDER — HEPARIN SODIUM 1000 [USP'U]/ML
60 INJECTION, SOLUTION INTRAVENOUS; SUBCUTANEOUS PRN
Status: DISCONTINUED | OUTPATIENT
Start: 2022-01-01 | End: 2022-01-01

## 2022-01-01 RX ORDER — SODIUM CHLORIDE 0.9 % (FLUSH) 0.9 %
5-40 SYRINGE (ML) INJECTION EVERY 12 HOURS SCHEDULED
Status: DISCONTINUED | OUTPATIENT
Start: 2022-01-01 | End: 2022-01-01

## 2022-01-01 RX ORDER — POTASSIUM CHLORIDE 7.45 MG/ML
10 INJECTION INTRAVENOUS PRN
Status: DISCONTINUED | OUTPATIENT
Start: 2022-01-01 | End: 2022-01-01 | Stop reason: SDUPTHER

## 2022-01-01 RX ORDER — SODIUM CHLORIDE 0.9 % (FLUSH) 0.9 %
10 SYRINGE (ML) INJECTION PRN
Status: DISCONTINUED | OUTPATIENT
Start: 2022-01-01 | End: 2022-01-01 | Stop reason: SDUPTHER

## 2022-01-01 RX ORDER — SODIUM CHLORIDE 0.9 % (FLUSH) 0.9 %
5-40 SYRINGE (ML) INJECTION PRN
Status: DISCONTINUED | OUTPATIENT
Start: 2022-01-01 | End: 2022-08-14 | Stop reason: HOSPADM

## 2022-01-01 RX ORDER — HEPARIN SODIUM 10000 [USP'U]/100ML
0-3000 INJECTION, SOLUTION INTRAVENOUS CONTINUOUS
Status: DISCONTINUED | OUTPATIENT
Start: 2022-01-01 | End: 2022-01-01

## 2022-01-01 RX ORDER — HEPARIN SODIUM 1000 [USP'U]/ML
1900 INJECTION, SOLUTION INTRAVENOUS; SUBCUTANEOUS ONCE
Status: COMPLETED | OUTPATIENT
Start: 2022-01-01 | End: 2022-01-01

## 2022-01-01 RX ORDER — DEXMEDETOMIDINE HYDROCHLORIDE 4 UG/ML
0.2 INJECTION, SOLUTION INTRAVENOUS CONTINUOUS
Status: DISCONTINUED | OUTPATIENT
Start: 2022-01-01 | End: 2022-01-01

## 2022-01-01 RX ORDER — LIDOCAINE HYDROCHLORIDE 20 MG/ML
INJECTION, SOLUTION EPIDURAL; INFILTRATION; INTRACAUDAL; PERINEURAL PRN
Status: DISCONTINUED | OUTPATIENT
Start: 2022-01-01 | End: 2022-01-01 | Stop reason: ALTCHOICE

## 2022-01-01 RX ORDER — LORAZEPAM 2 MG/ML
2 CONCENTRATE ORAL EVERY 4 HOURS PRN
Status: DISCONTINUED | OUTPATIENT
Start: 2022-01-01 | End: 2022-01-01

## 2022-01-01 RX ORDER — ACETAMINOPHEN 650 MG/1
650 SUPPOSITORY RECTAL EVERY 6 HOURS PRN
Status: DISCONTINUED | OUTPATIENT
Start: 2022-01-01 | End: 2022-08-14 | Stop reason: HOSPADM

## 2022-01-01 RX ORDER — POTASSIUM CHLORIDE 29.8 MG/ML
20 INJECTION INTRAVENOUS PRN
Status: DISCONTINUED | OUTPATIENT
Start: 2022-01-01 | End: 2022-08-14 | Stop reason: HOSPADM

## 2022-01-01 RX ORDER — ALBUTEROL SULFATE 2.5 MG/3ML
2.5 SOLUTION RESPIRATORY (INHALATION) 2 TIMES DAILY
Status: DISCONTINUED | OUTPATIENT
Start: 2022-01-01 | End: 2022-08-14 | Stop reason: HOSPADM

## 2022-01-01 RX ORDER — ASPIRIN 81 MG/1
81 TABLET, CHEWABLE ORAL DAILY
Status: DISCONTINUED | OUTPATIENT
Start: 2022-01-01 | End: 2022-01-01

## 2022-01-01 RX ORDER — METRONIDAZOLE 500 MG/100ML
500 INJECTION, SOLUTION INTRAVENOUS EVERY 8 HOURS
Status: COMPLETED | OUTPATIENT
Start: 2022-01-01 | End: 2022-01-01

## 2022-01-01 RX ORDER — PROPOFOL 10 MG/ML
5-50 INJECTION, EMULSION INTRAVENOUS CONTINUOUS
Status: DISCONTINUED | OUTPATIENT
Start: 2022-01-01 | End: 2022-01-01

## 2022-01-01 RX ORDER — CLOPIDOGREL BISULFATE 75 MG/1
75 TABLET ORAL DAILY
Status: DISCONTINUED | OUTPATIENT
Start: 2022-01-01 | End: 2022-01-01

## 2022-01-01 RX ORDER — IPRATROPIUM BROMIDE 21 UG/1
2 SPRAY, METERED NASAL 3 TIMES DAILY
COMMUNITY

## 2022-01-01 RX ORDER — SODIUM CHLORIDE 9 MG/ML
25 INJECTION, SOLUTION INTRAVENOUS PRN
Status: DISCONTINUED | OUTPATIENT
Start: 2022-01-01 | End: 2022-08-14 | Stop reason: HOSPADM

## 2022-01-01 RX ORDER — DILTIAZEM HYDROCHLORIDE 5 MG/ML
5 INJECTION INTRAVENOUS ONCE
Status: DISCONTINUED | OUTPATIENT
Start: 2022-01-01 | End: 2022-01-01

## 2022-01-01 RX ORDER — SODIUM CHLORIDE 0.9 % (FLUSH) 0.9 %
10 SYRINGE (ML) INJECTION EVERY 12 HOURS SCHEDULED
Status: DISCONTINUED | OUTPATIENT
Start: 2022-01-01 | End: 2022-01-01 | Stop reason: SDUPTHER

## 2022-01-01 RX ORDER — DONEPEZIL HYDROCHLORIDE 10 MG/1
10 TABLET, FILM COATED ORAL NIGHTLY
Status: DISCONTINUED | OUTPATIENT
Start: 2022-01-01 | End: 2022-01-01

## 2022-01-01 RX ORDER — PROMETHAZINE HYDROCHLORIDE 25 MG/1
12.5 TABLET ORAL EVERY 6 HOURS PRN
Status: DISCONTINUED | OUTPATIENT
Start: 2022-01-01 | End: 2022-08-14 | Stop reason: HOSPADM

## 2022-01-01 RX ORDER — ACETAMINOPHEN 325 MG/1
650 TABLET ORAL EVERY 6 HOURS PRN
Status: DISCONTINUED | OUTPATIENT
Start: 2022-01-01 | End: 2022-08-14 | Stop reason: HOSPADM

## 2022-01-01 RX ADMIN — DIGOXIN 250 MCG: 0.25 INJECTION INTRAMUSCULAR; INTRAVENOUS at 06:31

## 2022-01-01 RX ADMIN — Medication 50 MCG/HR: at 05:13

## 2022-01-01 RX ADMIN — METOPROLOL TARTRATE 25 MG: 25 TABLET, FILM COATED ORAL at 10:35

## 2022-01-01 RX ADMIN — Medication 1 CAPSULE: at 08:15

## 2022-01-01 RX ADMIN — CALCIUM GLUCONATE 1000 MG: 20 INJECTION, SOLUTION INTRAVENOUS at 09:54

## 2022-01-01 RX ADMIN — METRONIDAZOLE 500 MG: 500 INJECTION, SOLUTION INTRAVENOUS at 06:53

## 2022-01-01 RX ADMIN — METOPROLOL TARTRATE 12.5 MG: 25 TABLET, FILM COATED ORAL at 08:19

## 2022-01-01 RX ADMIN — CEFEPIME 2000 MG: 2 INJECTION, POWDER, FOR SOLUTION INTRAVENOUS at 18:08

## 2022-01-01 RX ADMIN — Medication 10 ML: at 21:12

## 2022-01-01 RX ADMIN — Medication 1 CAPSULE: at 15:51

## 2022-01-01 RX ADMIN — CHLORHEXIDINE GLUCONATE 15 ML: 1.2 RINSE ORAL at 21:28

## 2022-01-01 RX ADMIN — CEFEPIME 2000 MG: 2 INJECTION, POWDER, FOR SOLUTION INTRAVENOUS at 17:15

## 2022-01-01 RX ADMIN — CEFEPIME 2000 MG: 2 INJECTION, POWDER, FOR SOLUTION INTRAVENOUS at 04:40

## 2022-01-01 RX ADMIN — QUETIAPINE FUMARATE 50 MG: 25 TABLET ORAL at 20:52

## 2022-01-01 RX ADMIN — DONEPEZIL HYDROCHLORIDE 10 MG: 10 TABLET, FILM COATED ORAL at 20:20

## 2022-01-01 RX ADMIN — FENTANYL CITRATE 50 MCG: 50 INJECTION INTRAMUSCULAR; INTRAVENOUS at 19:42

## 2022-01-01 RX ADMIN — ATORVASTATIN CALCIUM 40 MG: 40 TABLET, FILM COATED ORAL at 08:42

## 2022-01-01 RX ADMIN — CALCIUM GLUCONATE 1000 MG: 20 INJECTION, SOLUTION INTRAVENOUS at 10:04

## 2022-01-01 RX ADMIN — METRONIDAZOLE 500 MG: 500 INJECTION, SOLUTION INTRAVENOUS at 06:25

## 2022-01-01 RX ADMIN — IRON SUCROSE 200 MG: 20 INJECTION, SOLUTION INTRAVENOUS at 08:43

## 2022-01-01 RX ADMIN — Medication 40 MG: at 06:50

## 2022-01-01 RX ADMIN — MORPHINE SULFATE 2 MG: 2 INJECTION, SOLUTION INTRAMUSCULAR; INTRAVENOUS at 05:47

## 2022-01-01 RX ADMIN — MEMANTINE 10 MG: 5 TABLET ORAL at 19:46

## 2022-01-01 RX ADMIN — ASPIRIN 81 MG: 81 TABLET, CHEWABLE ORAL at 07:58

## 2022-01-01 RX ADMIN — FOLIC ACID 1 MG: 1 TABLET ORAL at 08:19

## 2022-01-01 RX ADMIN — MEMANTINE 10 MG: 5 TABLET ORAL at 20:20

## 2022-01-01 RX ADMIN — SODIUM PHOSPHATE, MONOBASIC, MONOHYDRATE 20.31 MMOL: 276; 142 INJECTION, SOLUTION INTRAVENOUS at 10:32

## 2022-01-01 RX ADMIN — MEMANTINE 10 MG: 5 TABLET ORAL at 10:27

## 2022-01-01 RX ADMIN — MORPHINE SULFATE 2 MG: 2 INJECTION, SOLUTION INTRAMUSCULAR; INTRAVENOUS at 10:10

## 2022-01-01 RX ADMIN — CHLORHEXIDINE GLUCONATE 15 ML: 1.2 RINSE ORAL at 20:07

## 2022-01-01 RX ADMIN — MEMANTINE 10 MG: 5 TABLET ORAL at 07:59

## 2022-01-01 RX ADMIN — ATORVASTATIN CALCIUM 40 MG: 40 TABLET, FILM COATED ORAL at 10:27

## 2022-01-01 RX ADMIN — METRONIDAZOLE 500 MG: 500 INJECTION, SOLUTION INTRAVENOUS at 14:31

## 2022-01-01 RX ADMIN — Medication 40 MG: at 17:11

## 2022-01-01 RX ADMIN — QUETIAPINE FUMARATE 50 MG: 25 TABLET ORAL at 19:22

## 2022-01-01 RX ADMIN — TAMSULOSIN HYDROCHLORIDE 0.8 MG: 0.4 CAPSULE ORAL at 08:32

## 2022-01-01 RX ADMIN — Medication 2 MG: at 04:48

## 2022-01-01 RX ADMIN — CEFEPIME 2000 MG: 2 INJECTION, POWDER, FOR SOLUTION INTRAVENOUS at 06:27

## 2022-01-01 RX ADMIN — CEFEPIME 2000 MG: 2 INJECTION, POWDER, FOR SOLUTION INTRAVENOUS at 17:13

## 2022-01-01 RX ADMIN — CHLORHEXIDINE GLUCONATE 15 ML: 1.2 RINSE ORAL at 08:24

## 2022-01-01 RX ADMIN — IRON SUCROSE 200 MG: 20 INJECTION, SOLUTION INTRAVENOUS at 08:29

## 2022-01-01 RX ADMIN — Medication 1 CAPSULE: at 07:58

## 2022-01-01 RX ADMIN — Medication 10 ML: at 07:58

## 2022-01-01 RX ADMIN — Medication 10 ML: at 08:00

## 2022-01-01 RX ADMIN — Medication 1 CAPSULE: at 17:10

## 2022-01-01 RX ADMIN — Medication 40 MG: at 18:25

## 2022-01-01 RX ADMIN — ENOXAPARIN SODIUM 40 MG: 100 INJECTION SUBCUTANEOUS at 08:15

## 2022-01-01 RX ADMIN — METOPROLOL TARTRATE 12.5 MG: 25 TABLET, FILM COATED ORAL at 20:20

## 2022-01-01 RX ADMIN — Medication 40 MG: at 08:15

## 2022-01-01 RX ADMIN — SODIUM CHLORIDE 5 ML/HR: 9 INJECTION, SOLUTION INTRAVENOUS at 05:42

## 2022-01-01 RX ADMIN — FOLIC ACID 1 MG: 1 TABLET ORAL at 08:30

## 2022-01-01 RX ADMIN — MORPHINE SULFATE 2 MG: 2 INJECTION, SOLUTION INTRAMUSCULAR; INTRAVENOUS at 23:12

## 2022-01-01 RX ADMIN — MAGNESIUM SULFATE HEPTAHYDRATE 2000 MG: 40 INJECTION, SOLUTION INTRAVENOUS at 11:28

## 2022-01-01 RX ADMIN — CALCIUM GLUCONATE 1000 MG: 20 INJECTION, SOLUTION INTRAVENOUS at 08:38

## 2022-01-01 RX ADMIN — Medication 40 MG: at 05:41

## 2022-01-01 RX ADMIN — Medication 2 MG: at 16:36

## 2022-01-01 RX ADMIN — VANCOMYCIN HYDROCHLORIDE 1000 MG: 1 INJECTION, POWDER, LYOPHILIZED, FOR SOLUTION INTRAVENOUS at 14:05

## 2022-01-01 RX ADMIN — ASPIRIN 81 MG: 81 TABLET, CHEWABLE ORAL at 08:41

## 2022-01-01 RX ADMIN — Medication 10 ML: at 21:43

## 2022-01-01 RX ADMIN — ENOXAPARIN SODIUM 40 MG: 100 INJECTION SUBCUTANEOUS at 08:43

## 2022-01-01 RX ADMIN — METRONIDAZOLE 500 MG: 500 INJECTION, SOLUTION INTRAVENOUS at 06:12

## 2022-01-01 RX ADMIN — CLOPIDOGREL BISULFATE 75 MG: 75 TABLET ORAL at 08:19

## 2022-01-01 RX ADMIN — HEPARIN SODIUM 1900 UNITS: 1000 INJECTION INTRAVENOUS; SUBCUTANEOUS at 22:04

## 2022-01-01 RX ADMIN — ACETAMINOPHEN 650 MG: 325 TABLET ORAL at 16:12

## 2022-01-01 RX ADMIN — ACETAMINOPHEN 650 MG: 325 TABLET ORAL at 04:13

## 2022-01-01 RX ADMIN — SODIUM PHOSPHATE, MONOBASIC, MONOHYDRATE 10.02 MMOL: 276; 142 INJECTION, SOLUTION INTRAVENOUS at 07:09

## 2022-01-01 RX ADMIN — LEVETIRACETAM 500 MG: 5 INJECTION, SOLUTION INTRAVENOUS at 17:46

## 2022-01-01 RX ADMIN — CLOPIDOGREL BISULFATE 75 MG: 75 TABLET ORAL at 07:58

## 2022-01-01 RX ADMIN — LEVETIRACETAM 500 MG: 5 INJECTION, SOLUTION INTRAVENOUS at 17:57

## 2022-01-01 RX ADMIN — Medication 10 ML: at 20:52

## 2022-01-01 RX ADMIN — CHLORHEXIDINE GLUCONATE 15 ML: 1.2 RINSE ORAL at 20:52

## 2022-01-01 RX ADMIN — MORPHINE SULFATE 2 MG: 2 INJECTION, SOLUTION INTRAMUSCULAR; INTRAVENOUS at 06:31

## 2022-01-01 RX ADMIN — Medication 40 MG: at 17:59

## 2022-01-01 RX ADMIN — METOPROLOL TARTRATE 25 MG: 25 TABLET, FILM COATED ORAL at 08:30

## 2022-01-01 RX ADMIN — Medication 1 CAPSULE: at 08:30

## 2022-01-01 RX ADMIN — LEVETIRACETAM 500 MG: 5 INJECTION, SOLUTION INTRAVENOUS at 17:17

## 2022-01-01 RX ADMIN — MEMANTINE 10 MG: 5 TABLET ORAL at 08:15

## 2022-01-01 RX ADMIN — ENOXAPARIN SODIUM 40 MG: 100 INJECTION SUBCUTANEOUS at 08:32

## 2022-01-01 RX ADMIN — METRONIDAZOLE 500 MG: 500 INJECTION, SOLUTION INTRAVENOUS at 05:30

## 2022-01-01 RX ADMIN — ENOXAPARIN SODIUM 40 MG: 100 INJECTION SUBCUTANEOUS at 08:29

## 2022-01-01 RX ADMIN — DOCUSATE SODIUM 100 MG: 50 LIQUID ORAL at 08:18

## 2022-01-01 RX ADMIN — MORPHINE SULFATE 2 MG: 2 INJECTION, SOLUTION INTRAMUSCULAR; INTRAVENOUS at 00:58

## 2022-01-01 RX ADMIN — FUROSEMIDE 40 MG: 10 INJECTION, SOLUTION INTRAMUSCULAR; INTRAVENOUS at 15:51

## 2022-01-01 RX ADMIN — ASPIRIN 81 MG: 81 TABLET, CHEWABLE ORAL at 08:19

## 2022-01-01 RX ADMIN — ENOXAPARIN SODIUM 40 MG: 100 INJECTION SUBCUTANEOUS at 08:41

## 2022-01-01 RX ADMIN — CHLORHEXIDINE GLUCONATE 15 ML: 1.2 RINSE ORAL at 08:17

## 2022-01-01 RX ADMIN — ATORVASTATIN CALCIUM 40 MG: 40 TABLET, FILM COATED ORAL at 08:30

## 2022-01-01 RX ADMIN — MEMANTINE 10 MG: 5 TABLET ORAL at 08:41

## 2022-01-01 RX ADMIN — CHLORHEXIDINE GLUCONATE 15 ML: 1.2 RINSE ORAL at 21:12

## 2022-01-01 RX ADMIN — ATORVASTATIN CALCIUM 40 MG: 40 TABLET, FILM COATED ORAL at 07:57

## 2022-01-01 RX ADMIN — FENTANYL CITRATE 50 MCG: 50 INJECTION INTRAMUSCULAR; INTRAVENOUS at 04:42

## 2022-01-01 RX ADMIN — FENTANYL CITRATE 100 MCG: 50 INJECTION, SOLUTION INTRAMUSCULAR; INTRAVENOUS at 09:58

## 2022-01-01 RX ADMIN — MEMANTINE 10 MG: 5 TABLET ORAL at 21:22

## 2022-01-01 RX ADMIN — CEFEPIME 2000 MG: 2 INJECTION, POWDER, FOR SOLUTION INTRAVENOUS at 17:32

## 2022-01-01 RX ADMIN — Medication 40 MG: at 17:57

## 2022-01-01 RX ADMIN — Medication 10 ML: at 19:58

## 2022-01-01 RX ADMIN — PROPOFOL 110 MG: 10 INJECTION, EMULSION INTRAVENOUS at 09:38

## 2022-01-01 RX ADMIN — ACETAMINOPHEN 650 MG: 325 TABLET ORAL at 17:10

## 2022-01-01 RX ADMIN — MAGNESIUM SULFATE HEPTAHYDRATE 2000 MG: 40 INJECTION, SOLUTION INTRAVENOUS at 09:11

## 2022-01-01 RX ADMIN — LEVETIRACETAM 500 MG: 5 INJECTION, SOLUTION INTRAVENOUS at 06:01

## 2022-01-01 RX ADMIN — Medication 40 MG: at 10:25

## 2022-01-01 RX ADMIN — ASPIRIN 81 MG: 81 TABLET, CHEWABLE ORAL at 08:30

## 2022-01-01 RX ADMIN — Medication 5 MCG/MIN: at 03:45

## 2022-01-01 RX ADMIN — CEFEPIME 2000 MG: 2 INJECTION, POWDER, FOR SOLUTION INTRAVENOUS at 18:00

## 2022-01-01 RX ADMIN — DIBASIC SODIUM PHOSPHATE, MONOBASIC POTASSIUM PHOSPHATE AND MONOBASIC SODIUM PHOSPHATE 1 TABLET: 852; 155; 130 TABLET ORAL at 08:19

## 2022-01-01 RX ADMIN — DONEPEZIL HYDROCHLORIDE 10 MG: 10 TABLET, FILM COATED ORAL at 19:46

## 2022-01-01 RX ADMIN — DONEPEZIL HYDROCHLORIDE 10 MG: 10 TABLET, FILM COATED ORAL at 21:05

## 2022-01-01 RX ADMIN — CEFEPIME 2000 MG: 2 INJECTION, POWDER, FOR SOLUTION INTRAVENOUS at 05:58

## 2022-01-01 RX ADMIN — IRON SUCROSE 200 MG: 20 INJECTION, SOLUTION INTRAVENOUS at 12:11

## 2022-01-01 RX ADMIN — Medication 1 CAPSULE: at 17:11

## 2022-01-01 RX ADMIN — FOLIC ACID 1 MG: 1 TABLET ORAL at 07:57

## 2022-01-01 RX ADMIN — LEVETIRACETAM 500 MG: 5 INJECTION, SOLUTION INTRAVENOUS at 05:34

## 2022-01-01 RX ADMIN — FOLIC ACID 1 MG: 1 TABLET ORAL at 08:15

## 2022-01-01 RX ADMIN — Medication 25 MCG/HR: at 20:36

## 2022-01-01 RX ADMIN — LEVETIRACETAM 500 MG: 5 INJECTION, SOLUTION INTRAVENOUS at 17:15

## 2022-01-01 RX ADMIN — TAMSULOSIN HYDROCHLORIDE 0.8 MG: 0.4 CAPSULE ORAL at 08:16

## 2022-01-01 RX ADMIN — MEMANTINE 10 MG: 5 TABLET ORAL at 20:52

## 2022-01-01 RX ADMIN — SODIUM CHLORIDE 1000 ML: 9 INJECTION, SOLUTION INTRAVENOUS at 10:08

## 2022-01-01 RX ADMIN — Medication 25 MCG/HR: at 21:07

## 2022-01-01 RX ADMIN — DONEPEZIL HYDROCHLORIDE 10 MG: 10 TABLET, FILM COATED ORAL at 21:22

## 2022-01-01 RX ADMIN — POTASSIUM CHLORIDE 20 MEQ: 29.8 INJECTION, SOLUTION INTRAVENOUS at 10:25

## 2022-01-01 RX ADMIN — ATORVASTATIN CALCIUM 40 MG: 40 TABLET, FILM COATED ORAL at 08:15

## 2022-01-01 RX ADMIN — ALTEPLASE 1 MG: 2.2 INJECTION, POWDER, LYOPHILIZED, FOR SOLUTION INTRAVENOUS at 15:07

## 2022-01-01 RX ADMIN — MORPHINE SULFATE 2 MG: 2 INJECTION, SOLUTION INTRAMUSCULAR; INTRAVENOUS at 04:21

## 2022-01-01 RX ADMIN — Medication 1 CAPSULE: at 16:24

## 2022-01-01 RX ADMIN — LEVETIRACETAM 500 MG: 5 INJECTION, SOLUTION INTRAVENOUS at 18:18

## 2022-01-01 RX ADMIN — CLOPIDOGREL BISULFATE 75 MG: 75 TABLET ORAL at 08:30

## 2022-01-01 RX ADMIN — MEMANTINE 10 MG: 5 TABLET ORAL at 07:58

## 2022-01-01 RX ADMIN — FUROSEMIDE 40 MG: 10 INJECTION, SOLUTION INTRAMUSCULAR; INTRAVENOUS at 10:01

## 2022-01-01 RX ADMIN — FENTANYL CITRATE 50 MCG: 50 INJECTION INTRAMUSCULAR; INTRAVENOUS at 12:11

## 2022-01-01 RX ADMIN — Medication 40 MG: at 18:27

## 2022-01-01 RX ADMIN — Medication 5 MCG/MIN: at 10:08

## 2022-01-01 RX ADMIN — LEVETIRACETAM 500 MG: 5 INJECTION, SOLUTION INTRAVENOUS at 06:29

## 2022-01-01 RX ADMIN — LEVETIRACETAM 500 MG: 5 INJECTION, SOLUTION INTRAVENOUS at 18:11

## 2022-01-01 RX ADMIN — MORPHINE SULFATE 2 MG: 2 INJECTION, SOLUTION INTRAMUSCULAR; INTRAVENOUS at 10:14

## 2022-01-01 RX ADMIN — ACETAMINOPHEN 650 MG: 325 TABLET ORAL at 04:19

## 2022-01-01 RX ADMIN — Medication 10 ML: at 10:14

## 2022-01-01 RX ADMIN — METOPROLOL TARTRATE 12.5 MG: 25 TABLET, FILM COATED ORAL at 08:28

## 2022-01-01 RX ADMIN — CHLORHEXIDINE GLUCONATE 15 ML: 1.2 RINSE ORAL at 19:56

## 2022-01-01 RX ADMIN — ASPIRIN 81 MG: 81 TABLET, CHEWABLE ORAL at 08:15

## 2022-01-01 RX ADMIN — QUETIAPINE FUMARATE 50 MG: 25 TABLET ORAL at 21:12

## 2022-01-01 RX ADMIN — METOPROLOL TARTRATE 12.5 MG: 25 TABLET, FILM COATED ORAL at 19:24

## 2022-01-01 RX ADMIN — CALCIUM GLUCONATE 1000 MG: 20 INJECTION, SOLUTION INTRAVENOUS at 06:08

## 2022-01-01 RX ADMIN — CLOPIDOGREL BISULFATE 75 MG: 75 TABLET ORAL at 08:41

## 2022-01-01 RX ADMIN — FENTANYL CITRATE 50 MCG: 50 INJECTION INTRAMUSCULAR; INTRAVENOUS at 14:05

## 2022-01-01 RX ADMIN — LEVETIRACETAM 500 MG: 5 INJECTION, SOLUTION INTRAVENOUS at 05:49

## 2022-01-01 RX ADMIN — MORPHINE SULFATE 2 MG: 2 INJECTION, SOLUTION INTRAMUSCULAR; INTRAVENOUS at 12:17

## 2022-01-01 RX ADMIN — ENOXAPARIN SODIUM 40 MG: 100 INJECTION SUBCUTANEOUS at 08:18

## 2022-01-01 RX ADMIN — MORPHINE SULFATE 2 MG: 2 INJECTION, SOLUTION INTRAMUSCULAR; INTRAVENOUS at 04:26

## 2022-01-01 RX ADMIN — QUETIAPINE FUMARATE 50 MG: 25 TABLET ORAL at 21:21

## 2022-01-01 RX ADMIN — METOPROLOL TARTRATE 12.5 MG: 25 TABLET, FILM COATED ORAL at 19:46

## 2022-01-01 RX ADMIN — CHLORHEXIDINE GLUCONATE 15 ML: 1.2 RINSE ORAL at 00:19

## 2022-01-01 RX ADMIN — METOPROLOL TARTRATE 25 MG: 25 TABLET, FILM COATED ORAL at 21:05

## 2022-01-01 RX ADMIN — ACETAMINOPHEN 650 MG: 325 TABLET ORAL at 19:47

## 2022-01-01 RX ADMIN — Medication 2 MG: at 20:54

## 2022-01-01 RX ADMIN — Medication 40 MG: at 18:06

## 2022-01-01 RX ADMIN — METRONIDAZOLE 500 MG: 500 INJECTION, SOLUTION INTRAVENOUS at 05:53

## 2022-01-01 RX ADMIN — Medication 40 MG: at 06:29

## 2022-01-01 RX ADMIN — Medication 50 MCG: at 21:07

## 2022-01-01 RX ADMIN — MEMANTINE 10 MG: 5 TABLET ORAL at 08:19

## 2022-01-01 RX ADMIN — PROPOFOL 5 MCG/KG/MIN: 10 INJECTION, EMULSION INTRAVENOUS at 10:13

## 2022-01-01 RX ADMIN — DIBASIC SODIUM PHOSPHATE, MONOBASIC POTASSIUM PHOSPHATE AND MONOBASIC SODIUM PHOSPHATE 1 TABLET: 852; 155; 130 TABLET ORAL at 07:58

## 2022-01-01 RX ADMIN — METOPROLOL TARTRATE 12.5 MG: 25 TABLET, FILM COATED ORAL at 14:30

## 2022-01-01 RX ADMIN — Medication 10 ML: at 08:44

## 2022-01-01 RX ADMIN — HEPARIN SODIUM 750 UNITS/HR: 10000 INJECTION, SOLUTION INTRAVENOUS at 05:26

## 2022-01-01 RX ADMIN — SODIUM PHOSPHATE, MONOBASIC, MONOHYDRATE AND SODIUM PHOSPHATE, DIBASIC, ANHYDROUS 20 MMOL: 276; 142 INJECTION, SOLUTION INTRAVENOUS at 10:30

## 2022-01-01 RX ADMIN — Medication 1 CAPSULE: at 16:12

## 2022-01-01 RX ADMIN — METOPROLOL TARTRATE 25 MG: 25 TABLET, FILM COATED ORAL at 08:15

## 2022-01-01 RX ADMIN — ASPIRIN 300 MG: 300 SUPPOSITORY RECTAL at 05:14

## 2022-01-01 RX ADMIN — POTASSIUM CHLORIDE 20 MEQ: 29.8 INJECTION, SOLUTION INTRAVENOUS at 05:15

## 2022-01-01 RX ADMIN — METOPROLOL TARTRATE 25 MG: 25 TABLET, FILM COATED ORAL at 21:12

## 2022-01-01 RX ADMIN — Medication 10 ML: at 20:30

## 2022-01-01 RX ADMIN — Medication 2 MG: at 18:44

## 2022-01-01 RX ADMIN — Medication 25 MCG/HR: at 19:47

## 2022-01-01 RX ADMIN — QUETIAPINE FUMARATE 50 MG: 25 TABLET ORAL at 20:19

## 2022-01-01 RX ADMIN — FUROSEMIDE 40 MG: 10 INJECTION, SOLUTION INTRAMUSCULAR; INTRAVENOUS at 08:18

## 2022-01-01 RX ADMIN — Medication 10 ML: at 09:05

## 2022-01-01 RX ADMIN — DOCUSATE SODIUM 100 MG: 50 LIQUID ORAL at 08:42

## 2022-01-01 RX ADMIN — DONEPEZIL HYDROCHLORIDE 10 MG: 10 TABLET, FILM COATED ORAL at 19:24

## 2022-01-01 RX ADMIN — CLOPIDOGREL BISULFATE 75 MG: 75 TABLET ORAL at 08:15

## 2022-01-01 RX ADMIN — Medication 40 MG: at 05:59

## 2022-01-01 RX ADMIN — HEPARIN SODIUM 3740 UNITS: 1000 INJECTION INTRAVENOUS; SUBCUTANEOUS at 05:19

## 2022-01-01 RX ADMIN — FENTANYL CITRATE 50 MCG: 50 INJECTION INTRAMUSCULAR; INTRAVENOUS at 03:17

## 2022-01-01 RX ADMIN — CHLORHEXIDINE GLUCONATE 15 ML: 1.2 RINSE ORAL at 09:04

## 2022-01-01 RX ADMIN — CHLORHEXIDINE GLUCONATE 15 ML: 1.2 RINSE ORAL at 07:56

## 2022-01-01 RX ADMIN — DIBASIC SODIUM PHOSPHATE, MONOBASIC POTASSIUM PHOSPHATE AND MONOBASIC SODIUM PHOSPHATE 1 TABLET: 852; 155; 130 TABLET ORAL at 19:46

## 2022-01-01 RX ADMIN — DIBASIC SODIUM PHOSPHATE, MONOBASIC POTASSIUM PHOSPHATE AND MONOBASIC SODIUM PHOSPHATE 1 TABLET: 852; 155; 130 TABLET ORAL at 14:30

## 2022-01-01 RX ADMIN — Medication 50 MCG/HR: at 00:24

## 2022-01-01 RX ADMIN — Medication 1 CAPSULE: at 08:16

## 2022-01-01 RX ADMIN — MORPHINE SULFATE 2 MG: 2 INJECTION, SOLUTION INTRAMUSCULAR; INTRAVENOUS at 16:02

## 2022-01-01 RX ADMIN — CEFEPIME 2000 MG: 2 INJECTION, POWDER, FOR SOLUTION INTRAVENOUS at 05:53

## 2022-01-01 RX ADMIN — IRON SUCROSE 200 MG: 20 INJECTION, SOLUTION INTRAVENOUS at 08:19

## 2022-01-01 RX ADMIN — Medication 40 MG: at 18:11

## 2022-01-01 RX ADMIN — LEVETIRACETAM 500 MG: 5 INJECTION, SOLUTION INTRAVENOUS at 06:26

## 2022-01-01 RX ADMIN — TAMSULOSIN HYDROCHLORIDE 0.8 MG: 0.4 CAPSULE ORAL at 07:58

## 2022-01-01 RX ADMIN — CEFEPIME 2000 MG: 2 INJECTION, POWDER, FOR SOLUTION INTRAVENOUS at 05:31

## 2022-01-01 RX ADMIN — CALCIUM GLUCONATE 1000 MG: 20 INJECTION, SOLUTION INTRAVENOUS at 09:11

## 2022-01-01 RX ADMIN — Medication 2 MG: at 00:55

## 2022-01-01 RX ADMIN — POTASSIUM CHLORIDE 20 MEQ: 29.8 INJECTION, SOLUTION INTRAVENOUS at 06:19

## 2022-01-01 RX ADMIN — QUETIAPINE FUMARATE 50 MG: 25 TABLET ORAL at 21:05

## 2022-01-01 RX ADMIN — METRONIDAZOLE 500 MG: 500 INJECTION, SOLUTION INTRAVENOUS at 22:12

## 2022-01-01 RX ADMIN — CEFEPIME 2000 MG: 2 INJECTION, POWDER, FOR SOLUTION INTRAVENOUS at 18:37

## 2022-01-01 RX ADMIN — FENTANYL CITRATE 50 MCG: 50 INJECTION INTRAMUSCULAR; INTRAVENOUS at 20:20

## 2022-01-01 RX ADMIN — MORPHINE SULFATE 2 MG: 2 INJECTION, SOLUTION INTRAMUSCULAR; INTRAVENOUS at 01:15

## 2022-01-01 RX ADMIN — ATORVASTATIN CALCIUM 40 MG: 40 TABLET, FILM COATED ORAL at 08:19

## 2022-01-01 RX ADMIN — ASPIRIN 81 MG: 81 TABLET, CHEWABLE ORAL at 09:56

## 2022-01-01 RX ADMIN — Medication 1 CAPSULE: at 07:59

## 2022-01-01 RX ADMIN — CHLORHEXIDINE GLUCONATE 15 ML: 1.2 RINSE ORAL at 10:34

## 2022-01-01 RX ADMIN — DIBASIC SODIUM PHOSPHATE, MONOBASIC POTASSIUM PHOSPHATE AND MONOBASIC SODIUM PHOSPHATE 1 TABLET: 852; 155; 130 TABLET ORAL at 20:19

## 2022-01-01 RX ADMIN — SODIUM PHOSPHATE, MONOBASIC, MONOHYDRATE AND SODIUM PHOSPHATE, DIBASIC, ANHYDROUS 20.07 MMOL: 276; 142 INJECTION, SOLUTION INTRAVENOUS at 08:42

## 2022-01-01 RX ADMIN — MORPHINE SULFATE 2 MG: 2 INJECTION, SOLUTION INTRAMUSCULAR; INTRAVENOUS at 17:58

## 2022-01-01 RX ADMIN — LEVETIRACETAM 500 MG: 5 INJECTION, SOLUTION INTRAVENOUS at 05:31

## 2022-01-01 RX ADMIN — Medication 10 ML: at 19:48

## 2022-01-01 RX ADMIN — LEVETIRACETAM 500 MG: 5 INJECTION, SOLUTION INTRAVENOUS at 17:59

## 2022-01-01 RX ADMIN — CLOPIDOGREL BISULFATE 75 MG: 75 TABLET ORAL at 07:59

## 2022-01-01 RX ADMIN — MORPHINE SULFATE 2 MG: 2 INJECTION, SOLUTION INTRAMUSCULAR; INTRAVENOUS at 17:25

## 2022-01-01 RX ADMIN — QUETIAPINE FUMARATE 50 MG: 25 TABLET ORAL at 19:46

## 2022-01-01 RX ADMIN — DIBASIC SODIUM PHOSPHATE, MONOBASIC POTASSIUM PHOSPHATE AND MONOBASIC SODIUM PHOSPHATE 1 TABLET: 852; 155; 130 TABLET ORAL at 19:24

## 2022-01-01 RX ADMIN — MORPHINE SULFATE 2 MG: 2 INJECTION, SOLUTION INTRAMUSCULAR; INTRAVENOUS at 19:41

## 2022-01-01 RX ADMIN — CEFEPIME 2000 MG: 2 INJECTION, POWDER, FOR SOLUTION INTRAVENOUS at 05:01

## 2022-01-01 RX ADMIN — Medication 40 MG: at 05:01

## 2022-01-01 RX ADMIN — Medication 10 ML: at 21:27

## 2022-01-01 RX ADMIN — MORPHINE SULFATE 2 MG: 2 INJECTION, SOLUTION INTRAMUSCULAR; INTRAVENOUS at 17:02

## 2022-01-01 RX ADMIN — MEMANTINE 10 MG: 5 TABLET ORAL at 21:05

## 2022-01-01 RX ADMIN — Medication 10 ML: at 08:20

## 2022-01-01 RX ADMIN — TAMSULOSIN HYDROCHLORIDE 0.8 MG: 0.4 CAPSULE ORAL at 10:27

## 2022-01-01 RX ADMIN — ACETAMINOPHEN 650 MG: 325 TABLET ORAL at 22:12

## 2022-01-01 RX ADMIN — CHLORHEXIDINE GLUCONATE 15 ML: 1.2 RINSE ORAL at 08:21

## 2022-01-01 RX ADMIN — Medication 10 ML: at 08:15

## 2022-01-01 RX ADMIN — MIDAZOLAM 2 MG: 1 INJECTION INTRAMUSCULAR; INTRAVENOUS at 09:58

## 2022-01-01 RX ADMIN — ACETAMINOPHEN 650 MG: 325 TABLET ORAL at 19:22

## 2022-01-01 RX ADMIN — ACETYLCYSTEINE 600 MG: 200 SOLUTION ORAL; RESPIRATORY (INHALATION) at 11:31

## 2022-01-01 RX ADMIN — FOLIC ACID 1 MG: 1 TABLET ORAL at 08:41

## 2022-01-01 RX ADMIN — LEVETIRACETAM 500 MG: 5 INJECTION, SOLUTION INTRAVENOUS at 05:09

## 2022-01-01 RX ADMIN — Medication 5 MCG/MIN: at 09:29

## 2022-01-01 RX ADMIN — METRONIDAZOLE 500 MG: 500 INJECTION, SOLUTION INTRAVENOUS at 21:00

## 2022-01-01 RX ADMIN — MEMANTINE 10 MG: 5 TABLET ORAL at 08:30

## 2022-01-01 RX ADMIN — CHLORHEXIDINE GLUCONATE 15 ML: 1.2 RINSE ORAL at 08:13

## 2022-01-01 RX ADMIN — FOLIC ACID 1 MG: 1 TABLET ORAL at 14:30

## 2022-01-01 RX ADMIN — POTASSIUM CHLORIDE 20 MEQ: 29.8 INJECTION, SOLUTION INTRAVENOUS at 08:18

## 2022-01-01 RX ADMIN — DEXMEDETOMIDINE HYDROCHLORIDE 0.6 MCG/KG/HR: 400 INJECTION INTRAVENOUS at 14:06

## 2022-01-01 RX ADMIN — SODIUM PHOSPHATE, MONOBASIC, MONOHYDRATE 20.31 MMOL: 276; 142 INJECTION, SOLUTION INTRAVENOUS at 05:44

## 2022-01-01 RX ADMIN — METRONIDAZOLE 500 MG: 500 INJECTION, SOLUTION INTRAVENOUS at 14:04

## 2022-01-01 RX ADMIN — Medication 40 MG: at 06:21

## 2022-01-01 RX ADMIN — PROPOFOL 120 MG: 10 INJECTION, EMULSION INTRAVENOUS at 10:37

## 2022-01-01 RX ADMIN — CLOPIDOGREL BISULFATE 75 MG: 75 TABLET ORAL at 10:27

## 2022-01-01 RX ADMIN — CEFEPIME 2000 MG: 2 INJECTION, POWDER, FOR SOLUTION INTRAVENOUS at 05:52

## 2022-01-01 RX ADMIN — SODIUM PHOSPHATE, MONOBASIC, MONOHYDRATE 10.17 MMOL: 276; 142 INJECTION, SOLUTION INTRAVENOUS at 06:20

## 2022-01-01 RX ADMIN — CHLORHEXIDINE GLUCONATE 15 ML: 1.2 RINSE ORAL at 07:59

## 2022-01-01 RX ADMIN — LEVETIRACETAM 500 MG: 5 INJECTION, SOLUTION INTRAVENOUS at 05:48

## 2022-01-01 RX ADMIN — DONEPEZIL HYDROCHLORIDE 10 MG: 10 TABLET, FILM COATED ORAL at 20:52

## 2022-01-01 RX ADMIN — MORPHINE SULFATE 2 MG: 2 INJECTION, SOLUTION INTRAMUSCULAR; INTRAVENOUS at 22:35

## 2022-01-01 RX ADMIN — MORPHINE SULFATE 2 MG: 2 INJECTION, SOLUTION INTRAMUSCULAR; INTRAVENOUS at 03:50

## 2022-01-01 RX ADMIN — CHLORHEXIDINE GLUCONATE 15 ML: 1.2 RINSE ORAL at 20:33

## 2022-01-01 RX ADMIN — MORPHINE SULFATE 2 MG: 2 INJECTION, SOLUTION INTRAMUSCULAR; INTRAVENOUS at 08:45

## 2022-01-01 RX ADMIN — MEMANTINE 10 MG: 5 TABLET ORAL at 21:12

## 2022-01-01 RX ADMIN — METHYLPREDNISOLONE SODIUM SUCCINATE 40 MG: 40 INJECTION, POWDER, FOR SOLUTION INTRAMUSCULAR; INTRAVENOUS at 14:00

## 2022-01-01 RX ADMIN — MEMANTINE 10 MG: 5 TABLET ORAL at 19:23

## 2022-01-01 RX ADMIN — METRONIDAZOLE 500 MG: 500 INJECTION, SOLUTION INTRAVENOUS at 22:06

## 2022-01-01 RX ADMIN — Medication 1 CAPSULE: at 17:58

## 2022-01-01 RX ADMIN — Medication 1 CAPSULE: at 08:19

## 2022-01-01 RX ADMIN — METRONIDAZOLE 500 MG: 500 INJECTION, SOLUTION INTRAVENOUS at 15:05

## 2022-01-01 RX ADMIN — CEFEPIME 2000 MG: 2 INJECTION, POWDER, FOR SOLUTION INTRAVENOUS at 05:47

## 2022-01-01 RX ADMIN — MORPHINE SULFATE 2 MG: 2 INJECTION, SOLUTION INTRAMUSCULAR; INTRAVENOUS at 14:42

## 2022-01-01 RX ADMIN — DONEPEZIL HYDROCHLORIDE 10 MG: 10 TABLET, FILM COATED ORAL at 21:12

## 2022-01-01 RX ADMIN — Medication 10 ML: at 10:16

## 2022-01-01 RX ADMIN — Medication 1 CAPSULE: at 08:42

## 2022-01-01 RX ADMIN — Medication 10 ML: at 19:43

## 2022-01-01 RX ADMIN — FENTANYL CITRATE 50 MCG: 50 INJECTION INTRAMUSCULAR; INTRAVENOUS at 10:25

## 2022-01-01 RX ADMIN — METRONIDAZOLE 500 MG: 500 INJECTION, SOLUTION INTRAVENOUS at 14:06

## 2022-01-01 RX ADMIN — POTASSIUM PHOSPHATE, MONOBASIC AND POTASSIUM PHOSPHATE, DIBASIC 20 MMOL: 224; 236 INJECTION, SOLUTION, CONCENTRATE INTRAVENOUS at 10:09

## 2022-01-01 RX ADMIN — Medication 1 CAPSULE: at 16:35

## 2022-01-01 RX ADMIN — Medication 2 MG: at 05:24

## 2022-01-01 RX ADMIN — MORPHINE SULFATE 2 MG: 2 INJECTION, SOLUTION INTRAMUSCULAR; INTRAVENOUS at 16:17

## 2022-01-01 RX ADMIN — THIAMINE HYDROCHLORIDE 100 MG: 100 INJECTION, SOLUTION INTRAMUSCULAR; INTRAVENOUS at 15:42

## 2022-01-01 RX ADMIN — ALTEPLASE 1 MG: 2.2 INJECTION, POWDER, LYOPHILIZED, FOR SOLUTION INTRAVENOUS at 13:58

## 2022-01-01 RX ADMIN — METRONIDAZOLE 500 MG: 500 INJECTION, SOLUTION INTRAVENOUS at 21:27

## 2022-01-01 RX ADMIN — IRON SUCROSE 200 MG: 20 INJECTION, SOLUTION INTRAVENOUS at 08:10

## 2022-01-01 RX ADMIN — Medication 10 ML: at 08:16

## 2022-01-01 RX ADMIN — METRONIDAZOLE 500 MG: 500 INJECTION, SOLUTION INTRAVENOUS at 22:08

## 2022-01-01 RX ADMIN — CEFEPIME 2000 MG: 2 INJECTION, POWDER, FOR SOLUTION INTRAVENOUS at 16:33

## 2022-01-01 RX ADMIN — METRONIDAZOLE 500 MG: 500 INJECTION, SOLUTION INTRAVENOUS at 13:57

## 2022-01-01 RX ADMIN — Medication 1 CAPSULE: at 17:32

## 2022-01-01 RX ADMIN — Medication 50 MCG: at 21:40

## 2022-01-01 RX ADMIN — ATORVASTATIN CALCIUM 40 MG: 40 TABLET, FILM COATED ORAL at 07:59

## 2022-01-01 ASSESSMENT — PULMONARY FUNCTION TESTS
PIF_VALUE: 10
PIF_VALUE: 20
PIF_VALUE: 24
PIF_VALUE: 10
PIF_VALUE: 26
PIF_VALUE: 28
PIF_VALUE: 20
PIF_VALUE: 20
PIF_VALUE: 22
PIF_VALUE: 12
PIF_VALUE: 25
PIF_VALUE: 25
PIF_VALUE: 26
PIF_VALUE: 11
PIF_VALUE: 22
PIF_VALUE: 30
PIF_VALUE: 24
PIF_VALUE: 11
PIF_VALUE: 25
PIF_VALUE: 20
PIF_VALUE: 19
PIF_VALUE: 23
PIF_VALUE: 26
PIF_VALUE: 20
PIF_VALUE: 25
PIF_VALUE: 22
PIF_VALUE: 24
PIF_VALUE: 10
PIF_VALUE: 23
PIF_VALUE: 11
PIF_VALUE: 21
PIF_VALUE: 23
PIF_VALUE: 22
PIF_VALUE: 22
PIF_VALUE: 20
PIF_VALUE: 29
PIF_VALUE: 40
PIF_VALUE: 18
PIF_VALUE: 22
PIF_VALUE: 21
PIF_VALUE: 24
PIF_VALUE: 23
PIF_VALUE: 22
PIF_VALUE: 25
PIF_VALUE: 20
PIF_VALUE: 20
PIF_VALUE: 18
PIF_VALUE: 34
PIF_VALUE: 11
PIF_VALUE: 25
PIF_VALUE: 20
PIF_VALUE: 23
PIF_VALUE: 21
PIF_VALUE: 26
PIF_VALUE: 22
PIF_VALUE: 10
PIF_VALUE: 26
PIF_VALUE: 18
PIF_VALUE: 21
PIF_VALUE: 27
PIF_VALUE: 22
PIF_VALUE: 11
PIF_VALUE: 28
PIF_VALUE: 25
PIF_VALUE: 17
PIF_VALUE: 11
PIF_VALUE: 11
PIF_VALUE: 23
PIF_VALUE: 20
PIF_VALUE: 20
PIF_VALUE: 23
PIF_VALUE: 11
PIF_VALUE: 40
PIF_VALUE: 39
PIF_VALUE: 22
PIF_VALUE: 10
PIF_VALUE: 11
PIF_VALUE: 21
PIF_VALUE: 14
PIF_VALUE: 22
PIF_VALUE: 31
PIF_VALUE: 23
PIF_VALUE: 11
PIF_VALUE: 18
PIF_VALUE: 20
PIF_VALUE: 26
PIF_VALUE: 17
PIF_VALUE: 26
PIF_VALUE: 10
PIF_VALUE: 13
PIF_VALUE: 23
PIF_VALUE: 10
PIF_VALUE: 20
PIF_VALUE: 17
PIF_VALUE: 22
PIF_VALUE: 11
PIF_VALUE: 25
PIF_VALUE: 29
PIF_VALUE: 19
PIF_VALUE: 18
PIF_VALUE: 10
PIF_VALUE: 11
PIF_VALUE: 24
PIF_VALUE: 16
PIF_VALUE: 19
PIF_VALUE: 24
PIF_VALUE: 18
PIF_VALUE: 22
PIF_VALUE: 10
PIF_VALUE: 23
PIF_VALUE: 24
PIF_VALUE: 25
PIF_VALUE: 23
PIF_VALUE: 25
PIF_VALUE: 22
PIF_VALUE: 11
PIF_VALUE: 23
PIF_VALUE: 14
PIF_VALUE: 22
PIF_VALUE: 10
PIF_VALUE: 33
PIF_VALUE: 23
PIF_VALUE: 20
PIF_VALUE: 11
PIF_VALUE: 15
PIF_VALUE: 28
PIF_VALUE: 17
PIF_VALUE: 11
PIF_VALUE: 11
PIF_VALUE: 19
PIF_VALUE: 18
PIF_VALUE: 20
PIF_VALUE: 10
PIF_VALUE: 20
PIF_VALUE: 20
PIF_VALUE: 22
PIF_VALUE: 25
PIF_VALUE: 23
PIF_VALUE: 18
PIF_VALUE: 22
PIF_VALUE: 11
PIF_VALUE: 22
PIF_VALUE: 22
PIF_VALUE: 27
PIF_VALUE: 24
PIF_VALUE: 22
PIF_VALUE: 20
PIF_VALUE: 18
PIF_VALUE: 32
PIF_VALUE: 10
PIF_VALUE: 11
PIF_VALUE: 25
PIF_VALUE: 11
PIF_VALUE: 28
PIF_VALUE: 26
PIF_VALUE: 24
PIF_VALUE: 11
PIF_VALUE: 22
PIF_VALUE: 11
PIF_VALUE: 23
PIF_VALUE: 25
PIF_VALUE: 10
PIF_VALUE: 14
PIF_VALUE: 11
PIF_VALUE: 11
PIF_VALUE: 22
PIF_VALUE: 27
PIF_VALUE: 23
PIF_VALUE: 26
PIF_VALUE: 17
PIF_VALUE: 26
PIF_VALUE: 22
PIF_VALUE: 17
PIF_VALUE: 11
PIF_VALUE: 27
PIF_VALUE: 26
PIF_VALUE: 29
PIF_VALUE: 25
PIF_VALUE: 22
PIF_VALUE: 11
PIF_VALUE: 17
PIF_VALUE: 22
PIF_VALUE: 26
PIF_VALUE: 10
PIF_VALUE: 20
PIF_VALUE: 11
PIF_VALUE: 13
PIF_VALUE: 22
PIF_VALUE: 35
PIF_VALUE: 11
PIF_VALUE: 26
PIF_VALUE: 40
PIF_VALUE: 18
PIF_VALUE: 17
PIF_VALUE: 11
PIF_VALUE: 23
PIF_VALUE: 20
PIF_VALUE: 25
PIF_VALUE: 18
PIF_VALUE: 22
PIF_VALUE: 23
PIF_VALUE: 11
PIF_VALUE: 27
PIF_VALUE: 21
PIF_VALUE: 22
PIF_VALUE: 23
PIF_VALUE: 27
PIF_VALUE: 10
PIF_VALUE: 24
PIF_VALUE: 20
PIF_VALUE: 21
PIF_VALUE: 20
PIF_VALUE: 21
PIF_VALUE: 22
PIF_VALUE: 28
PIF_VALUE: 22
PIF_VALUE: 26
PIF_VALUE: 18
PIF_VALUE: 11
PIF_VALUE: 11
PIF_VALUE: 28
PIF_VALUE: 21
PIF_VALUE: 22
PIF_VALUE: 14
PIF_VALUE: 20
PIF_VALUE: 14
PIF_VALUE: 24
PIF_VALUE: 11
PIF_VALUE: 27
PIF_VALUE: 25
PIF_VALUE: 17
PIF_VALUE: 36
PIF_VALUE: 23
PIF_VALUE: 24
PIF_VALUE: 11
PIF_VALUE: 18
PIF_VALUE: 11
PIF_VALUE: 10
PIF_VALUE: 12
PIF_VALUE: 26
PIF_VALUE: 20
PIF_VALUE: 10
PIF_VALUE: 11
PIF_VALUE: 25
PIF_VALUE: 23
PIF_VALUE: 18
PIF_VALUE: 23
PIF_VALUE: 23
PIF_VALUE: 20
PIF_VALUE: 20
PIF_VALUE: 22
PIF_VALUE: 26
PIF_VALUE: 20
PIF_VALUE: 25
PIF_VALUE: 14
PIF_VALUE: 25
PIF_VALUE: 22
PIF_VALUE: 11
PIF_VALUE: 17
PIF_VALUE: 23
PIF_VALUE: 23
PIF_VALUE: 25
PIF_VALUE: 23
PIF_VALUE: 11
PIF_VALUE: 24
PIF_VALUE: 23
PIF_VALUE: 11
PIF_VALUE: 10
PIF_VALUE: 26
PIF_VALUE: 21
PIF_VALUE: 11
PIF_VALUE: 20
PIF_VALUE: 22
PIF_VALUE: 24
PIF_VALUE: 26
PIF_VALUE: 10
PIF_VALUE: 18
PIF_VALUE: 25
PIF_VALUE: 22
PIF_VALUE: 22
PIF_VALUE: 26
PIF_VALUE: 20
PIF_VALUE: 20
PIF_VALUE: 25
PIF_VALUE: 11
PIF_VALUE: 22
PIF_VALUE: 22
PIF_VALUE: 26
PIF_VALUE: 20
PIF_VALUE: 20
PIF_VALUE: 24
PIF_VALUE: 22
PIF_VALUE: 25
PIF_VALUE: 10
PIF_VALUE: 18
PIF_VALUE: 23
PIF_VALUE: 19
PIF_VALUE: 23
PIF_VALUE: 23
PIF_VALUE: 25
PIF_VALUE: 11
PIF_VALUE: 24
PIF_VALUE: 20
PIF_VALUE: 22
PIF_VALUE: 17
PIF_VALUE: 27
PIF_VALUE: 18
PIF_VALUE: 22
PIF_VALUE: 11
PIF_VALUE: 30

## 2022-01-01 ASSESSMENT — PAIN SCALES - GENERAL
PAINLEVEL_OUTOF10: 0
PAINLEVEL_OUTOF10: 1
PAINLEVEL_OUTOF10: 0
PAINLEVEL_OUTOF10: 1
PAINLEVEL_OUTOF10: 0

## 2022-01-01 ASSESSMENT — PAIN SCALES - WONG BAKER
WONGBAKER_NUMERICALRESPONSE: 0

## 2022-08-04 PROBLEM — G93.41 METABOLIC ENCEPHALOPATHY: Status: ACTIVE | Noted: 2022-01-01

## 2022-08-04 PROBLEM — J96.02 ACUTE RESPIRATORY FAILURE WITH HYPOXIA AND HYPERCAPNIA (HCC): Status: ACTIVE | Noted: 2022-01-01

## 2022-08-04 PROBLEM — R57.9 SHOCK (HCC): Status: ACTIVE | Noted: 2022-01-01

## 2022-08-04 PROBLEM — J18.9 PNEUMONIA OF RIGHT LOWER LOBE DUE TO INFECTIOUS ORGANISM: Status: ACTIVE | Noted: 2022-01-01

## 2022-08-04 PROBLEM — J96.01 ACUTE RESPIRATORY FAILURE WITH HYPOXIA AND HYPERCAPNIA (HCC): Status: ACTIVE | Noted: 2022-01-01

## 2022-08-04 PROBLEM — I21.4 NSTEMI (NON-ST ELEVATED MYOCARDIAL INFARCTION) (HCC): Status: ACTIVE | Noted: 2022-01-01

## 2022-08-04 PROBLEM — R65.21 SEPTIC SHOCK (HCC): Status: ACTIVE | Noted: 2022-01-01

## 2022-08-04 PROBLEM — A41.9 SEPTIC SHOCK (HCC): Status: ACTIVE | Noted: 2022-01-01

## 2022-08-04 NOTE — CONSULTS
Clinical Pharmacy Note  Vancomycin Consult    Ivette Corona is a [de-identified] y.o. male ordered Vancomycin for sepsis of unknown origin; consult received from Dr. Simon Mcmahan to manage therapy. Also receiving cefepime and metronidazole. Allergies:  Chlorpromazine     Temp max:  Temp (24hrs), Av.7 °F (36.5 °C), Min:97.7 °F (36.5 °C), Max:97.7 °F (36.5 °C)      No results for input(s): WBC in the last 72 hours. No results for input(s): BUN, CREATININE in the last 72 hours. No intake or output data in the 24 hours ending 22 0519    Culture Results:  Blood cultures ordered    Ht Readings from Last 1 Encounters:   22 5' 9\" (1.753 m)        Wt Readings from Last 1 Encounters:   22 137 lb 5.6 oz (62.3 kg)         CrCl cannot be calculated (No successful lab value found. ). Assessment  -[de-identified] y/o M transferred from [facility] with NSTEMI, sepsis, and respiratory distress. Currently intubated and sedated.   -Of note, patient received vancomycin 1g @ 0000 according to outside hospital.     Plan:  -Level ordered for 1200 to ensure adequate clearance of vancomycin. Level ordered early due to high risk for nephrotoxicity and transfer from outside hospital.  -Maintenance dose to be entered once 1200 level is assessed. Placeholder entered. Thank you for the consult.    Elsy Whyte, PharmD  2022 5:23 AM

## 2022-08-04 NOTE — PROGRESS NOTES
Spoke with Li humphrey [place] who called for a patient update. She provided name of his  at the facility if we need information.  Robert Weller  [phone]

## 2022-08-04 NOTE — PROGRESS NOTES
Clinical Pharmacy Note  Heparin Dosing       Lab Results   Component Value Date/Time    APTT 166.2 08/04/2022 11:45 AM     Lab Results   Component Value Date/Time    HGB 8.9 08/04/2022 05:06 AM    HCT 27.2 08/04/2022 05:06 AM    HCT 26.8 08/04/2022 05:06 AM     08/04/2022 05:06 AM       Current Infusion Rate: 750 units/hr    Plan:  Hold drip for 1 hour  Rate: decrease to 560 units/hr  Next aPTT: 8/4/22 2000    Pharmacy will continue to monitor and adjust based on aPTT results.   Siomara Carlson RP,8/4/2022,1:05 PM

## 2022-08-04 NOTE — PROCEDURES
BRONCHOSCOPY WITH BAL WHILE IN THE INTENSIVE CARE UNIT    Indications:  Pneumonia  Consent:  Signed on chart  Pre-op diagnosis:  Same    Type of sedation used: Moderate Sedation  Sedation:  Fentanyl 100 mcg, Versed 2 mg  Anesthetic:  None    Physician/patient face-to-face sedation start time: 0957   Physician/patient face-to-face sedation stop time: 1006  Total moderate sedation time in minutes: 9 minutes    Patient was monitored continuously 1:1 throughout the entire procedure while sedation was administered        Mallampati Class:  NA      ASA Class 4 - A patient with severe systemic disease that is a constant threat to life    Narrative: The patient was located in MICU bed 2122. The patient was intubated. The patient was hooked to continuous monitoring. I entered the ETT without difficulty. The right upper lobe demonstrated normal anatomy. The middle lobe demonstrated normal anatomy. The right lower lobe demonstrated normal anatomy. Purulent secretions in the RLL segments. The left upper lobe demonstrated normal anatomy. The left lower lobe demonstrated normal anatomy. I then wedged my scope in the posterior segment of the RLL to perform a BAL. I instilled 60 mL of normal saline. I suctioned 25 ml. The fluid was mucoid and bloody . I then removed my scope to check for bleeding. There was no evidence of bleeding. Estimated blood loss:  2 ml  Specimens:  25 ml of lavage  Post-op diagnosis:  Pneumonia  Complications: Hypotension post-procedure    DO ORALIA Bland [facility] Pulmonology, Sleep and Critical Care.

## 2022-08-04 NOTE — PROGRESS NOTES
Clinical Pharmacy Note  Vancomycin Consult    Yair Arango is a [de-identified] y.o. male ordered Vancomycin for sepsis; consult received from Dr. Jc Blanco to manage therapy. Also receiving cefepime and metronidazole. Allergies:  Chlorpromazine     Temp max:  Temp (24hrs), Av.6 °F (36.4 °C), Min:97.4 °F (36.3 °C), Max:97.7 °F (36.5 °C)      Recent Labs     22  0506   WBC 3.2*       Recent Labs     22  0506   BUN 31*   CREATININE 0.9         Intake/Output Summary (Last 24 hours) at 2022 1254  Last data filed at 2022 1128  Gross per 24 hour   Intake 1433.17 ml   Output 235 ml   Net 1198.17 ml       Culture Results:  pending    Ht Readings from Last 1 Encounters:   22 5' 9\" (1.753 m)        Wt Readings from Last 1 Encounters:   22 137 lb 5.6 oz (62.3 kg)         Estimated Creatinine Clearance: 58 mL/min (based on SCr of 0.9 mg/dL). Assessment:  Day # 1 of vancomycin. Current regimen: Intermittent dosing based on levels  Random level: 8.6 mg/L    Plan:  Vancomycin 1000 mg IV x 1 today  Random vancomycin level tomorrow in AM.    Thank you for the consult.      Aide Velazquez, [facility], PharmD, BCCCP

## 2022-08-04 NOTE — CONSULTS
Clinical Pharmacy Note  Heparin Dosing Consult    Yair Arango is a [de-identified] y.o. male ordered heparin per low dose nomogram by Dr. Jc Blanoc for NSTEMI. Patient transferred from outside facility. Confirmed with their ER that patient did not receive any anticoagulants prior to transfer. No results found for: APTT  Lab Results   Component Value Date/Time    HGB 8.9 08/04/2022 05:06 AM    HCT 27.2 08/04/2022 05:06 AM     08/04/2022 05:06 AM       Ht Readings from Last 1 Encounters:   08/04/22 5' 9\" (1.753 m)        Wt Readings from Last 1 Encounters:   08/04/22 137 lb 5.6 oz (62.3 kg)        Assessment/Plan:  Initial bolus: 3740 units  Initial infusion rate: 750 units/hr  Next aPTT: 1130 on 8/4    Pharmacy will continue to monitor adjust heparin based on aPTT results using nomogram below:     CAD/STEMI/NSTEMI/UA/AFIB Heparin Nomogram     Initial Bolus: 60 units/kg Max Bolus: 4,000 units       Initial Rate: 12 units/kg/hr Max Initial Rate: 1,000 units/hr     aPTT < 59    Heparin 60 units/kg bolus Increase infusion by 4 units/kg/hr        (maximum 4,000 units)   aPTT 59.1-72.9 Heparin 30 units/kg bolus Increase infusion by 2 units/kg/hr        (maximum 2,000 units)   aPTT     No bolus   No change   aPTT 102.1-109 No bolus   Decrease infusion by 1 units/kg/hr   aPTT 109.1-122.9 No bolus     Decrease infusion by 2 units/kg/hr   aPTT > 123    Hold heparin for 1 hour Decrease infusion by 3 units/kg/hr     Obtain aPTT 6 hours after initial bolus and 6 hours after any dose change until two consecutive therapeutic aPTTs are achieved - then daily.      Klaus Baum PharmD  8/4/2022 5:34 AM

## 2022-08-04 NOTE — PLAN OF CARE
Problem: Safety - Medical Restraint  Goal: Remains free of injury from restraints (Restraint for Interference with Medical Device)  Description: INTERVENTIONS:  1. Determine that other, less restrictive measures have been tried or would not be effective before applying the restraint  2. Evaluate the patient's condition at the time of restraint application  3. Inform patient/family regarding the reason for restraint  4. Q2H: Monitor safety, psychosocial status, comfort, nutrition and hydration  Outcome: Progressing  Flowsheets (Taken 8/4/2022 3215 by Perico Null RN)  Remains free of injury from restraints (restraint for interference with medical device): Every 2 hours: Monitor safety, psychosocial status, comfort, nutrition and hydration     Problem: Skin/Tissue Integrity  Goal: Absence of new skin breakdown  Description: 1. Monitor for areas of redness and/or skin breakdown  2. Assess vascular access sites hourly  3. Every 4-6 hours minimum:  Change oxygen saturation probe site  4. Every 4-6 hours:  If on nasal continuous positive airway pressure, respiratory therapy assess nares and determine need for appliance change or resting period.   Outcome: Progressing     Problem: Safety - Adult  Goal: Free from fall injury  Outcome: Progressing

## 2022-08-04 NOTE — PROGRESS NOTES
Patient seen and examined earlier today,     HPI:  [de-identified] y.o. male who presented to [facility] [place] with past medical history of Alzheimer's at disease in a group home, hyperlipidemia, hypertension, epilepsy on Keppra, mental retardation presented to the ED from group home due to worsening encephalopathy with patient being aspirated and coughing at the facility. At the outside hospital patient became unstable requiring multiple liters of fluid in addition requiring intubation to protect patient's airway given hypoxia. Patient also was put on central line and started on Levophed due to not being responsive to 30 mill per KG. Patient is admitted to [facility] for diagnosis of shock. Septic shock:  30 mL/kg of NS given prior to arrival with minimal improvement  Empirically on IV Vanco and cefepime with blood cultures pending  Echocardiogram ordered  Currently on Levophed  Add urine culture     Acute hypoxic respiratory failure: Suspected secondary to CHF, unstable NSTEMI  Currently intubated mechanically ventilated  Wean as tolerated  Underwent bronchoscopy 8/4, will follow BAL results      Non-ST elevation MI  EKG with new right bundle branch block, troponin leak noted  Continue heparin infusion  Appreciate cardiology input    Pancytopenia: Anemia panel ordered    Hypomagnesemia: Replaced     Acute encephalopathy, CT head negative prior to arrival  Neurochecks  Keppra 500 mg twice daily  Seizure precaution       Hyperlipidemia: Increase to high intensity statin.  Outpatient PCP follow up post-discharge  Essential Hypertension: Continue home medication  Dementia, MR: Continue medication  BPH: Continue medication

## 2022-08-04 NOTE — H&P
Hospital Medicine History & Physical      PCP: Ken Garza MD    Date of Admission: 8/4/2022    Date of Service: Pt seen/examined on 8/4/2022    Pt seen/examined face to face on and admitted as inpatient with expected LOS greater than two midnights due to medical therapy    Chief Complaint:    No chief complaint on file. Encephalopathy, coughing, hypoxia  History Of Present Illness:      [de-identified] y.o. male who presented to [facility] with past medical history of Alzheimer's at disease in a group home, hyperlipidemia, hypertension, epilepsy on Keppra, mental retardation presented to the ED from group home due to worsening encephalopathy with patient being aspirated and coughing at the facility. At the outside hospital patient became unstable requiring multiple liters of fluid in addition requiring intubation to protect patient's airway given hypoxia. Patient also was put on central line and started on Levophed due to not being responsive to 30 mill per KG. Patient is admitted to [facility] for diagnosis of shock. Patient intubated unable to obtain history  Past Medical History:          Diagnosis Date    Alzheimer's disease     lives in group home    Blepharitis     CHF (congestive heart failure) ([facility] [address]     Nuc GXT 07 normal. Echo 08 normal LVEF    CVA (cerebral infarction)     stable    Epilepsy ([facility] [address]     Hyperlipidemia     rec semi annual lipids with LDL goal <70    Hypertension     Impulse control disorder in adult     Mental retardation     Mitral insufficiency     Echo 08 mild MR    Osteoarthritis     Prostatic hyperplasia     Psychosis     Tobacco user     cessation discussed       Past Surgical History:    Unable to obtain history patient intubated  No past surgical history on file. Medications Prior to Admission:      Prior to Admission medications    Medication Sig Start Date End Date Taking?  Authorizing Provider   alendronate (FOSAMAX) 70 MG tablet Take 70 mg by mouth every 7 days Historical Provider, MD   bethanechol (URECHOLINE) 5 MG tablet Take 5 mg by mouth 3 times daily    Historical Provider, MD   enalapril (VASOTEC) 10 MG tablet Take 10 mg by mouth daily    Historical Provider, MD   furosemide (LASIX) 20 MG tablet Take 20 mg by mouth every 72 hours    Historical Provider, MD   Loratadine 10 MG CAPS Take by mouth daily    Historical Provider, MD   potassium chloride (MICRO-K) 10 MEQ CR capsule Take 10 mEq by mouth every 72 hours    Historical Provider, MD   QUEtiapine (SEROQUEL) 50 MG tablet Take 50 mg by mouth daily    Historical Provider, MD   tamsulosin (FLOMAX) 0.4 MG capsule Take 0.8 mg by mouth daily    Historical Provider, MD   traZODone (DESYREL) 50 MG tablet Take 25 mg by mouth nightly    Historical Provider, MD   vitamin E 400 UNIT capsule Take 400 Units by mouth daily    Historical Provider, MD   levetiracetam (KEPPRA) 500 MG tablet Take 500 mg by mouth 2 times daily. Historical Provider, MD   gemfibrozil (LOPID) 600 MG tablet Take 600 mg by mouth 2 times daily (before meals). Historical Provider, MD   ibuprofen (ADVIL;MOTRIN) 200 MG tablet Take 200 mg by mouth every 6 hours as needed. Historical Provider, MD   metoprolol (LOPRESSOR) 25 MG tablet Take 12.5 mg by mouth daily. Historical Provider, MD   Calcium Carbonate-Vitamin D (OYSTER SHELL CALCIUM 500 + D) 500-125 MG-UNIT TABS Take 1 tablet by mouth daily. Historical Provider, MD   clopidogrel (PLAVIX) 75 MG tablet Take 75 mg by mouth daily. Historical Provider, MD   benztropine (COGENTIN) 1 MG tablet Take 1 mg by mouth daily     Historical Provider, MD   haloperidol (HALDOL) 0.5 MG tablet Take 0.5 mg by mouth nightly. Historical Provider, MD   donepezil (ARICEPT) 10 MG tablet Take 10 mg by mouth nightly. Historical Provider, MD   aspirin 81 MG EC tablet Take 81 mg by mouth daily.       Historical Provider, MD   memantine (NAMENDA) 10 MG tablet Take 28 mg by mouth daily     Historical Provider, MD   rosuvastatin (CRESTOR) 10 MG tablet Take 1 tablet by mouth daily. 6/27/11   Crispin Cervantes MD       Allergies:  Chlorpromazine    Social History:          TOBACCO:   reports that he quit smoking about 9 years ago. His smoking use included cigars. He has a 25.00 pack-year smoking history. He has never used smokeless tobacco.  ETOH:   reports no history of alcohol use. E-cigarette/Vaping       Questions Responses    E-cigarette/Vaping Use     Start Date     Passive Exposure     Quit Date     Counseling Given     Comments               Family History:    Patient intubated  Family History reviewed with patient, and does not pertain and non-contributory to the current illness    No family history on file. REVIEW OF SYSTEMS:   Unable to obtain patient intubated sedated  PHYSICAL EXAM PERFORMED:    BP (!) 96/59   Pulse 65   Temp 97.7 °F (36.5 °C) (Axillary)   Resp 14   Ht 5' 9\" (1.753 m)   Wt 137 lb 5.6 oz (62.3 kg)   SpO2 100%   BMI 20.28 kg/m²     General appearance:  mild acute distress, appears older than stated age  HEENT:   atraumatic, sclera anicteric, Conjunctivae clear. Neck: Supple,Trachea midline, no goiter  Respiratory:minimal accessory muscle usage, Normal respiratory effort. Crackles bilaterally  Cardiovascular:  Regular rate and rhythm, capillary refill 2 seconds  Abdomen: Soft, non-tender, non-distended with normal bowel sounds. Musculoskeletal:  No clubbing, cyanosis. trace edema LE bilaterally. Skin: turgor normal.  No new rashes or lesions. Neurologic: GCS 3 T    Labs:     Recent Labs     08/04/22  0506   WBC 3.2*   HGB 8.9*   HCT 27.2*   *     Recent Labs     08/04/22  0506   PHOS 3.0     No results for input(s): AST, ALT, BILIDIR, BILITOT, ALKPHOS in the last 72 hours. No results for input(s): INR in the last 72 hours.   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  XR CHEST PORTABLE    (Results Pending)       ASSESSMENT AND PLAN:    Active Hospital Problems    Diagnosis Date Noted    Shock ([facility] [address] [R57.9] 08/04/2022     Priority: Medium     Shock:  Suspected to be cardiac  30 mL/kg of NS given prior to arrival with minimal improvement  Empirically on IV Vanco and cefepime with blood cultures pending  Echocardiogram ordered  Currently on Levophed    Acute hypoxic respiratory failure: Suspected secondary to CHF, unstable NSTEMI  Currently intubated mechanically ventilated  Wean as tolerated  SAT SBT every morning    Suspected STEMI equivalent:  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 The right bundle branch block and the primary AV block is new finding when compared to prior EKG although this is not a true fascicular block with the absence of third-degree block. Interventional cardiology was consulted given the doubling of the troponin with patient unable to give accurate history in regards to symptoms  Aspirin, Plavix, heparin drip  Echocardiogram pending    Pancytopenia: Anemia panel ordered    Hypomagnesemia: Replaced    Acute encephalopathy, CT head negative prior to arrival  Neurochecks  Keppra 500 mg twice daily  Seizure precaution      Hyperlipidemia: Increase to high intensity statin.  Outpatient PCP follow up post-discharge  Essential Hypertension: Continue home medication  Dementia, MR: Continue medication  BPH: Continue medication    Diet: NPO except meds ordered    DVT Prophylaxis: Heparin drip    Dispo:   Expected LOS greater than two midnights       Katy Bright,      CCT: 37 minutes

## 2022-08-04 NOTE — CONSULTS
PATIENT IS SEEN AT THE REQUEST OF DR. Sujey Stafford for ICU admission    CONSULTING PHYSICIAN: Deangelo    HISTORY OF PRESENT ILLNESS:  This is a [de-identified] y.o. male who presented admitted to [facility] from outside hospital.  He was at [facility] for low oxygen levels and hypotension. Troponin was elevated too. He was intubated and was being considered for transfer to [facility] or  but not beds were available. He was then sent her. He is intubated currently. Unable to provide any information at this time       Established Pulmonologist:  None    PAST MEDICAL HISTORY:  Past Medical History:   Diagnosis Date    Alzheimer's disease     lives in group home    Blepharitis     CHF (congestive heart failure) ([address]     Nuc GXT 07 normal. Echo 08 normal LVEF    CVA (cerebral infarction)     stable    Epilepsy ([address]     Hyperlipidemia     rec semi annual lipids with LDL goal <70    Hypertension     Impulse control disorder in adult     Mental retardation     Mitral insufficiency     Echo 08 mild MR    Osteoarthritis     Prostatic hyperplasia     Psychosis     Tobacco user     cessation discussed   Acute renal failure  Anemia  Arthritis  Benign prostate hyperplasia  Chronic renal insufficiency  Left hearing loss  Osteoporosis  Vascular dementia    PAST SURGICAL HISTORY:  Bladder neck contracture  PK plasma button transunthal resection of the prostate  Cytoscopy    FAMILY HISTORY:  Not obtainable     SOCIAL HISTORY:   reports that he quit smoking about 9 years ago. His smoking use included cigars. He has a 25.00 pack-year smoking history.  He has never used smokeless tobacco.    Scheduled Meds:   sodium chloride flush  10 mL IntraVENous 2 times per day    cefepime  2,000 mg IntraVENous Q12H    metroNIDAZOLE  500 mg IntraVENous Q8H    levETIRAcetam  500 mg IntraVENous Q12H    vancomycin (VANCOCIN) intermittent dosing (placeholder)   Other RX Placeholder    magnesium sulfate  2,000 mg IntraVENous Once    pantoprazole (PROTONIX) 40 mg injection 40 mg IntraVENous Q12H    atorvastatin  40 mg Oral Daily    [START ON 8/5/2022] aspirin  81 mg Oral Daily    clopidogrel  75 mg Oral Daily    donepezil  10 mg Oral Nightly    haloperidol  0.5 mg Oral Nightly    memantine  27.5 mg Oral Daily    QUEtiapine  50 mg Oral Daily    tamsulosin  0.8 mg Oral Daily       Continuous Infusions:   sodium chloride      norepinephrine Stopped (08/04/22 0430)    fentaNYL 50 mcg/hr (08/04/22 0513)    dexmedetomidine      heparin (PORCINE) Infusion 750 Units/hr (08/04/22 0526)       PRN Meds:  sodium chloride flush, sodium chloride, potassium chloride, magnesium sulfate, promethazine **OR** ondansetron, acetaminophen **OR** acetaminophen, perflutren lipid microspheres    ALLERGIES:  Patient is allergic to chlorpromazine. REVIEW OF SYSTEMS:  Constitutional: Negative for fever or chills  HENT: Negative for sore throat  Eyes: Negative for redness   Respiratory: Negative for dyspnea, cough  Cardiovascular: Negative for chest pain  Gastrointestinal: Negative for vomiting, diarrhea   Genitourinary: Negative for hematuria, negative for dysuria  Musculoskeletal: Negative for arthralgias   Skin: Negative for rash  Neurological: Negative for syncope  Hematological: Negative for adenopathy  Extremities:  Negative for swelling    PHYSICAL EXAM:  Blood pressure 106/63, pulse 61, temperature 97.7 °F (36.5 °C), temperature source Axillary, resp. rate 14, height 5' 9\" (1.753 m), weight 137 lb 5.6 oz (62.3 kg), SpO2 100 %.'  Gen: Sedated on vent, appears listed age   Eyes: PERRL. No sclera icterus. No conjunctival injection. ENT: No discharge. Pharynx with ETT and NG  Neck: Trachea midline. No obvious mass. Resp: RLL crackles    CV: Regular rate. Regular rhythm. No murmur or rub. GI: Non-tender. Non-distended. No hernia. BS present. Skin: Warm and dry. No nodule on exposed extremities. Lymph: No cervical LAD. No supraclavicular LAD. M/S: No cyanosis. No joint deformity.    Neuro: Awakens easily, follows intermittent commands  EXT:  no edema, no clubbing    LABS:  CBC:   Recent Labs     08/04/22  0506   WBC 3.2*   HGB 8.9*   HCT 26.8*  27.2*   MCV 93.5   *     BMP:   Recent Labs     08/04/22  0506   PHOS 3.0     LIVER PROFILE: No results for input(s): AST, ALT, LIPASE, BILIDIR, BILITOT, ALKPHOS in the last 72 hours. Invalid input(s): AMYLASE,  ALB  PT/INR: No results for input(s): PROTIME, INR in the last 72 hours. APTT: No results for input(s): APTT in the last 72 hours. UA:No results for input(s): NITRITE, COLORU, PHUR, LABCAST, WBCUA, RBCUA, MUCUS, TRICHOMONAS, YEAST, BACTERIA, CLARITYU, SPECGRAV, LEUKOCYTESUR, UROBILINOGEN, BILIRUBINUR, BLOODU, GLUCOSEU, AMORPHOUS in the last 72 hours. Invalid input(s): KETONESU  No results for input(s): PHART, INE4MSS, PO2ART in the last 72 hours. Cultures:  Pending      PFTs:   None      ECHO:  Nothing recent      ABG:    Chest X-ray:  Chest imaging was reviewed by me and showed severely rotated film. ETT very high, gastric tube in place. Bilateral airspace disease, worse in the RLL. Calcified hilum on right side     I reviewed all the above labs and studies pertaining to this visit. ASSESSMENT/PLAN:  Acute Hypoxic/Hypercapnic Respiratory Failure with saturations less than 90% on room air and pH less than 7.35, due to pneumonia  Full vent support  ABG now  Sedation for vent synchrony. DC precedex. Use fentanyl for now   Possible SBT in 24 hours   Septic Shock   Levophed for MAP of 60  IV fluid bolus  Continue with Vanco and Cefepime. Add Flagyl   RLL Pneumonia, may have risks for HCAP organisms due to living in group home  Antibiotics as above.   Bronchoscopy today  Check proal  Add lactobacillus   Check MRSA probe   Metabolic Encephalopathy  NSTEMI (peak troponin was 5)  Heparin gtt  Trend troponin  Cardiology evaluation  Dementia   Seroquel, Aricept       DVT prophylaxis  Heparin  GI prophylaxis  Protonix     Critical care time of 32 minutes. This does not include procedural time. Please see procedural notes for details.     DO [facility]

## 2022-08-04 NOTE — PROGRESS NOTES
Critical Aptt of <180 reported to pharmacist. They stated that this lab was drawn about an hour post heparin bolus and that no adjustments are to be made. Will reassess after next set of labs at 1130.

## 2022-08-04 NOTE — CONSULTS
NOTE IS FOR TEACHING PURPOSES ONLY. SEE FULL NOTE BY DR. Marilou Harrington DO  [facility] Pulmonary, Sleep and Critical Care  [phone]      PATIENT IS SEEN AT THE REQUEST OF DR. Svetlana Vick for ICU admission    CONSULTING PHYSICIAN: Dr. Bobby Bojorquez:  This is a [de-identified] y.o. male who presented to the ED on 8/3/22 with a CC of confusion. Patient is coming from [facility] in [place] and was seen by Dr. Tova Bautista, emergency medicine. Per note, he was transported by EMS between work and group home. On scene, his oxygen was in the 70s and with non-rebreather and was improved to mid 90s. Labs at ED showed elevated WBC. Troponin was elevated at 2.55 but improving here. HPI obtained from ED notes. Established Pulmonologist:      PAST MEDICAL HISTORY:  Past Medical History:   Diagnosis Date    Alzheimer's disease     lives in group home    Blepharitis     CHF (congestive heart failure) ([address]     Nuc GXT 07 normal. Echo 08 normal LVEF    CVA (cerebral infarction)     stable    Epilepsy ([address]     Hyperlipidemia     rec semi annual lipids with LDL goal <70    Hypertension     Impulse control disorder in adult     Mental retardation     Mitral insufficiency     Echo 08 mild MR    Osteoarthritis     Prostatic hyperplasia     Psychosis     Tobacco user     cessation discussed   Acute renal failure  Anemia  Arthritis   Benign prostate hyperplasia  Chronic renal insufficiency  Left hearing loss  Osteoporosis   Vascular dementia    PAST SURGICAL HISTORY:  Bladder neck contracture  PK plasma button transunthal resection of the prostate  Cytoscopy    FAMILY HISTORY:  unobtainable    SOCIAL HISTORY:   reports that he quit smoking about 9 years ago. His smoking use included cigars. He has a 25.00 pack-year smoking history.  He has never used smokeless tobacco.    Scheduled Meds:   sodium chloride flush  10 mL IntraVENous 2 times per day    cefepime  2,000 mg IntraVENous Q12H    metroNIDAZOLE  500 mg IntraVENous Q8H    levETIRAcetam  500 mg IntraVENous Q12H    vancomycin (VANCOCIN) intermittent dosing (placeholder)   Other RX Placeholder    magnesium sulfate  2,000 mg IntraVENous Once    pantoprazole (PROTONIX) 40 mg injection  40 mg IntraVENous Q12H    atorvastatin  40 mg Oral Daily    [START ON 8/5/2022] aspirin  81 mg Oral Daily    clopidogrel  75 mg Oral Daily    donepezil  10 mg Oral Nightly    haloperidol  0.5 mg Oral Nightly    memantine  27.5 mg Oral Daily    QUEtiapine  50 mg Oral Daily    tamsulosin  0.8 mg Oral Daily       Continuous Infusions:   sodium chloride      norepinephrine Stopped (08/04/22 0430)    fentaNYL 50 mcg/hr (08/04/22 0513)    dexmedetomidine      heparin (PORCINE) Infusion 750 Units/hr (08/04/22 0526)       PRN Meds:  sodium chloride flush, sodium chloride, potassium chloride, magnesium sulfate, promethazine **OR** ondansetron, acetaminophen **OR** acetaminophen, perflutren lipid microspheres    ALLERGIES:  Patient is allergic to chlorpromazine. REVIEW OF SYSTEMS:  Patient unable to give history due to sedation. PHYSICAL EXAM:  Blood pressure (!) 89/56, pulse 65, temperature 97.7 °F (36.5 °C), temperature source Axillary, resp. rate 14, height 5' 9\" (1.753 m), weight 137 lb 5.6 oz (62.3 kg), SpO2 99 %.'  Gen: Chronically ill-appearing, sedated, pale. Eyes: No sclera icterus. No conjunctival injection. ENT: Intubated. ETT placement. Resp: No accessory muscle use. Positive crackles to right lower lobe. No wheezes. No rhonchi. On ventilator. CV: Regular rate. Regular rhythm. No murmur or rub. Blood pressure cuff to right upper extremity. Skin: Warm and dry. No nodule on exposed extremities. Healed 2 cm wound to dorsal aspect of right foot. Bandages wrapped around heels to feet bilaterally. 1 IV in place to each extremity. M/S: No cyanosis. No joint deformity. Neuro: Responds to commands. Eyes open to commands.   EXT:   no edema, no clubbing    LABS:  CBC:   Recent Labs     08/04/22  0506   WBC 3.2*   HGB 8.9*   HCT 27.2*   MCV 93.5   *     BMP:   Recent Labs     08/04/22  0506   PHOS 3.0     LIVER PROFILE: No results for input(s): AST, ALT, LIPASE, BILIDIR, BILITOT, ALKPHOS in the last 72 hours. Invalid input(s): AMYLASE,  ALB  PT/INR: No results for input(s): PROTIME, INR in the last 72 hours. APTT: No results for input(s): APTT in the last 72 hours. UA:No results for input(s): NITRITE, COLORU, PHUR, LABCAST, WBCUA, RBCUA, MUCUS, TRICHOMONAS, YEAST, BACTERIA, CLARITYU, SPECGRAV, LEUKOCYTESUR, UROBILINOGEN, BILIRUBINUR, BLOODU, GLUCOSEU, AMORPHOUS in the last 72 hours. Invalid input(s): KETONESU  No results for input(s): PHART, ZAR5LPH, PO2ART in the last 72 hours. Cultures:  Blood: pending  Fungus: pending  Respiratory: pending    PFTs: N/A    ECHO: N/A      ABG:  Arterial Blood Gas result:  pO2 104.0; pCO2 38.6; pH 7.380;  HCO3 22.8, %O2 Sat 98.8%%      Chest X-ray:  Chest imaging was reviewed by me and showed high ETT with gastric tube in place. Bilateral airspace disease worse in RLL. Calcified hilum on right. Chest CT:  N/A    I reviewed all the above labs and studies pertaining to this visit. ASSESSMENT/PLAN:  Acute hypoxic/hypercapnic respiratory failure  Mechanical ventilation in place, no plans to extubate yet. Sedation for vent compliance. Septic shock   NS  Cefepime, metronidazole, vancomycin  Continue levophed 16 mg  Possible aspiration pneumonia to RLL  Bronchoscopy to visualize and culture pending  Antibiotics as above  Metabolic encephalopathy  Manage as above  NSTEMI (peak troponin 5)   Observe  Heparin drip  Consult cardiology  Dementia  Continue home donepezil     DVT prophylaxis: heparin  GI prophylaxis: pantoprazole    NOTE IS FOR TEACHING PURPOSES ONLY.   SEE FULL NOTE BY DO Joselito Gutierrez Pulmonary, Sleep and Critical Care  [phone]

## 2022-08-04 NOTE — PROGRESS NOTES
Comprehensive Nutrition Assessment    Type and Reason for Visit:  Initial (New mechanical vent)    Nutrition Recommendations/Plan:   RD to recommend nutrition support if unable to extubate in the next 24-48 hours. Vital 1.2 AF at goal rate 65ml/hr. Water flush per provider. Monitor propofol rate (currently not ordered) and pressor support (+Levo)     Malnutrition Assessment:  Malnutrition Status:  Insufficient data (08/04/22 1132)    Context:  Chronic Illness       Nutrition Assessment:    Pt is intubated and sedated. Sedated with fentanyl. No propofol running. Levo onboard. Hx Alzheimer's and MRDD. Admitted from group home with worsening encephalopathy, aspiration, and coughing. Pt with respiratory failure, septic shock, and STEMI. Underwent bronch today. Note full code. Will place EN recc's above if nutrition support is desired. RD to recommend initiation of nutrition in the next 24-48 hours if unable to extubate. Nutrition Related Findings:    Mg 1.50. Wound Type:  (Note foot wound but does not categorize in flowsheet)       Current Nutrition Intake & Therapies:    Average Meal Intake: NPO  Average Supplements Intake: NPO  Diet NPO Exceptions are: Ice Chips, Sips of Water with Meds  Current Tube Feeding (TF) Orders:  Goal TF & Flush Orders Provides: If EN needed, RD to recommend Vital 1.2 AF at goal rate 65ml/hr. This EN regimen provides 1300ml TV, 1560 kcals, 98 grams protein, and 1054ml free water. (Calculated x 20 hours to account for routine nursing care)    Anthropometric Measures:  Height: 5' 9\" (175.3 cm)  Ideal Body Weight (IBW): 160 lbs (73 kg)    Current Body Weight: 137 lb (62.1 kg), 85.6 % IBW. Weight Source: Bed Scale  Current BMI (kg/m2): 20.2  Weight Adjustment For: No Adjustment  BMI Categories: Normal Weight (BMI 18.5-24. 9)    Estimated Daily Nutrient Needs:  Energy Requirements Based On: Kcal/kg  Weight Used for Energy Requirements: Current  Energy (kcal/day): 5932-3511 (25-30kcal/62kg)  Weight Used for Protein Requirements: Current  Protein (g/day):  (1.2-2.0g/62kg)  Method Used for Fluid Requirements: Other (Comment)  Fluid (ml/day): per provider    Nutrition Diagnosis:   Inadequate oral intake related to impaired respiratory function as evidenced by intubation, NPO or clear liquid status due to medical condition    Nutrition Interventions:   Food and/or Nutrient Delivery: Continue NPO (Initiate most appropriate form of nutrition)  Nutrition Education/Counseling: Education not indicated  Coordination of Nutrition Care: Continue to monitor while inpatient     Goals:  Goals: other (specify)  Specify Other Goals: Initiate most appropriate form of nutrition by next RD assessment    Nutrition Monitoring and Evaluation:   Behavioral-Environmental Outcomes: None Identified  Food/Nutrient Intake Outcomes: Diet Advancement/Tolerance, Food and Nutrient Intake, Enteral Nutrition Intake/Tolerance, IVF Intake  Physical Signs/Symptoms Outcomes: Biochemical Data, Chewing or Swallowing, Fluid Status or Edema, Hemodynamic Status, Skin, Weight, Nutrition Focused Physical Findings    Discharge Planning:     Too soon to determine     Vu Conway, [address] [place] [address], KARINA  Contact: [phone]

## 2022-08-04 NOTE — CONSULTS
[facility]   CONSULTATION        History of Present Illness:  Brook Mcginnis is a [de-identified] y.o. patient who presented to the hospital from skilled nursing facility for Altered mentation . I have been asked to provide consultation regarding further management and testing. Difficult to obtain any discernable history from patient, obtained primarily through EMR. Patient unstable at SNF with greatly increasing O2 requirements - intubated and started on broad Abx as well as pressors. Trop mildly elevated, EKG with RBBB. Patient with baseline cognitive dysfunction from dementia however reportedly encephalopathic beyond baseline    Unable to obtain ROS: Intubated    Past Medical History:   has a past medical history of Alzheimer's disease, Blepharitis, CHF (congestive heart failure) ([facility] CVA (cerebral infarction), Epilepsy ([facility] Hyperlipidemia, Hypertension, Impulse control disorder in adult, Mental retardation, Mitral insufficiency, Osteoarthritis, Prostatic hyperplasia, Psychosis, and Tobacco user. Surgical History:   has no past surgical history on file. Social History:   reports that he quit smoking about 9 years ago. His smoking use included cigars. He has a 25.00 pack-year smoking history. He has never used smokeless tobacco. He reports that he does not drink alcohol and does not use drugs. Family History:  No family history of premature coronary artery disease, aortic disease, or valve disease. Home Medications:  Were reviewed and are listed in nursing record. and/or listed below  Prior to Admission medications    Medication Sig Start Date End Date Taking? Authorizing Provider   acetaminophen (TYLENOL) 500 MG tablet Take 500 mg by mouth daily as needed for Pain   Yes Historical Provider, MD   ferrous sulfate 324 (65 Fe) MG EC tablet Take 324 mg by mouth in the morning. Yes Historical Provider, MD   finasteride (PROSCAR) 5 MG tablet Take 5 mg by mouth in the morning.    Yes Historical Provider, MD   fluticasone (FLONASE) 50 MCG/ACT nasal spray 1 spray by Each Nostril route daily   Yes Historical Provider, MD   ipratropium (ATROVENT) 0.03 % nasal spray 2 sprays by Each Nostril route 3 times daily   Yes Historical Provider, MD   metoprolol succinate (TOPROL XL) 25 MG extended release tablet Take 12.5 mg by mouth at bedtime   Yes Historical Provider, MD   alendronate (FOSAMAX) 70 MG tablet Take 70 mg by mouth every 7 days Takes on Monday    Historical Provider, MD   enalapril (VASOTEC) 10 MG tablet Take 10 mg by mouth daily    Historical Provider, MD   furosemide (LASIX) 20 MG tablet Take 20 mg by mouth every 72 hours    Historical Provider, MD   Loratadine 10 MG CAPS Take 10 mg by mouth daily as needed (allergies)    Historical Provider, MD   potassium chloride (MICRO-K) 10 MEQ CR capsule Take 10 mEq by mouth every 72 hours    Historical Provider, MD   QUEtiapine (SEROQUEL) 50 MG tablet Take 50 mg by mouth at bedtime    Historical Provider, MD   tamsulosin (FLOMAX) 0.4 MG capsule Take 0.8 mg by mouth daily    Historical Provider, MD   traZODone (DESYREL) 50 MG tablet Take 25 mg by mouth nightly    Historical Provider, MD   vitamin E 400 UNIT capsule Take 400 Units by mouth daily    Historical Provider, MD   levetiracetam (KEPPRA) 500 MG tablet Take 500 mg by mouth 2 times daily. Historical Provider, MD   ibuprofen (ADVIL;MOTRIN) 400 MG tablet Take 400 mg by mouth in the morning and at bedtime    Historical Provider, MD   calcium-vitamin D (OSCAL-500) 500-200 MG-UNIT per tablet Take 1 tablet by mouth daily. Historical Provider, MD   clopidogrel (PLAVIX) 75 MG tablet Take 75 mg by mouth daily. Historical Provider, MD   donepezil (ARICEPT) 10 MG tablet Take 10 mg by mouth nightly. Historical Provider, MD   aspirin 81 MG EC tablet Take 81 mg by mouth daily. Historical Provider, MD   memantine (NAMENDA) 10 MG tablet Take 10 mg by mouth in the morning and 10 mg before bedtime. William Hercules Historical Provider, MD   rosuvastatin (CRESTOR) 10 MG tablet Take 1 tablet by mouth daily.  6/27/11   Lucia Hassan MD        Current Medications:  Current Facility-Administered Medications   Medication Dose Route Frequency Provider Last Rate Last Admin    sodium chloride flush 0.9 % injection 10 mL  10 mL IntraVENous 2 times per day Kat Shines A Deangelo, DO   10 mL at 08/04/22 1016    sodium chloride flush 0.9 % injection 10 mL  10 mL IntraVENous PRN Ahmad A Deangelo, DO        0.9 % sodium chloride infusion   IntraVENous PRN Ahmad A Deangelo, DO        potassium chloride 10 mEq/100 mL IVPB (Peripheral Line)  10 mEq IntraVENous PRN Kat Shines A Deangelo, DO        magnesium sulfate 2000 mg in 50 mL IVPB premix  2,000 mg IntraVENous PRN Kat Shines A Deangelo, DO        promethazine (PHENERGAN) tablet 12.5 mg  12.5 mg Oral Q6H PRN Ahmad A Deangelo, DO        Or    ondansetron (ZOFRAN) injection 4 mg  4 mg IntraVENous Q6H PRN Ahmad A Deangelo, DO        acetaminophen (TYLENOL) tablet 650 mg  650 mg Oral Q6H PRN Ahmad A Deangelo, DO        Or    acetaminophen (TYLENOL) suppository 650 mg  650 mg Rectal Q6H PRN Ahmad A Deangelo, DO        norepinephrine (LEVOPHED) 16 mg in dextrose 5% 250 mL infusion  1-100 mcg/min IntraVENous Continuous Ahmad A Deangelo, DO   Stopped at 08/04/22 1122    cefepime (MAXIPIME) 2000 mg IVPB minibag  2,000 mg IntraVENous Q12H Ahmad A Deangelo, DO   Stopped at 08/04/22 0624    metronidazole (FLAGYL) 500 mg in 0.9% NaCl 100 mL IVPB premix  500 mg IntraVENous Q8H Ahmad A Deangelo, DO   Stopped at 08/04/22 0753    fentaNYL (SUBLIMAZE) 1,000 mcg in sodium chloride 0.9% 100 mL infusion   mcg/hr IntraVENous Continuous Ahmad A Deangelo, DO 5 mL/hr at 08/04/22 0513 50 mcg/hr at 08/04/22 0513    heparin 25,000 unit in sodium chloride 0.45% 250 mL (premix) infusion  0-3,000 Units/hr IntraVENous Continuous Katy Bright DO 7.5 mL/hr at 08/04/22 0526 750 Units/hr at 08/04/22 0526    levETIRAcetam (KEPPRA) 500 mg/100 mL IVPB  500 mg IntraVENous Q12H Sudhakar Aponte, DO   Stopped at 08/04/22 5484    perflutren lipid microspheres (DEFINITY) injection 1.65 mg  1.5 mL IntraVENous ONCE PRN Sudhakar Aponte, DO        vancomycin (VANCOCIN) intermittent dosing (placeholder)   Other RX Placeholder Katy Bright, DO        pantoprazole (PROTONIX) 40 mg in sodium chloride (PF) 10 mL injection  40 mg IntraVENous Q12H Katy Bright, DO   40 mg at 08/04/22 1025    atorvastatin (LIPITOR) tablet 40 mg  40 mg Oral Daily Yamiled LUIS E Bright, DO   40 mg at 08/04/22 1027    [START ON 8/5/2022] aspirin EC tablet 81 mg  81 mg Oral Daily Katy Bright, DO        clopidogrel (PLAVIX) tablet 75 mg  75 mg Oral Daily Yamiled LUIS E Bright, DO   75 mg at 08/04/22 1027    donepezil (ARICEPT) tablet 10 mg  10 mg Oral Nightly Katy Bright, DO        QUEtiapine (SEROQUEL) tablet 50 mg  50 mg Oral Nightly Yamiled LUIS E Bright, DO        tamsulosin (FLOMAX) capsule 0.8 mg  0.8 mg Oral Daily Yamiled LUIS E Bright, DO   0.8 mg at 08/04/22 1027    magnesium sulfate 2000 mg in 50 mL IVPB premix  2,000 mg IntraVENous Once Adinajoseph Jackson APRN - CNP 25 mL/hr at 08/04/22 1128 2,000 mg at 08/04/22 1128    lidocaine PF 1 % injection             memantine (NAMENDA) tablet 10 mg  10 mg Oral BID Adina Manuel, APRN - CNP   10 mg at 08/04/22 1027    chlorhexidine (PERIDEX) 0.12 % solution 15 mL  15 mL Mouth/Throat BID Adina Manuel, APRN - CNP   15 mL at 08/04/22 1034        Allergies:  Chlorpromazine     Review of Systems:   Review of Systems - Unable to be obtained, intubated     Physical Examination:    Vitals:    08/04/22 1212   BP:    Pulse: 53   Resp: 14   Temp:    SpO2: 100%      Weight: 137 lb 5.6 oz (62.3 kg)          Vitals: BP (!) 91/48   Pulse 53   Temp 97.4 °F (36.3 °C) (Axillary)   Resp 14   Ht 5' 9\" (1.753 m)   Wt 137 lb 5.6 oz (62.3 kg)   SpO2 100%   BMI 20.28 kg/m²   General appearance:  Ill appearing [de-identified]y.o. year-old male who is intubated and on mechanical ventilation, in no distress Head: Normocephalic, no lesions, without obvious abnormality. Eye: PERRL  ENT:  ET Tube in place. Normal external ears and nose, moist mucus membranes, No nasal discharge, unable to visualize Pharynx. Neck: no adenopathy, no carotid bruit, no JVD  Lungs: coarse mechanical breath sounds on auscultation bilaterally  Heart: regular rate and rhythm, S1, S2 normal  Abdomen: soft, no grimace with deep palpation; Extremities: extremities normal without joint deformity or clubbing, atraumatic, no cyanosis, no cords appreciated  Skin: Warm and dry. Mental status: Sedated. Neurologic:  Cranial nerves II-XII are grossly non focal on limited exam. + Pupillary response.        Labs  CBC:   Lab Results   Component Value Date/Time    WBC 3.2 08/04/2022 05:06 AM    RBC 2.90 08/04/2022 05:06 AM    HGB 8.9 08/04/2022 05:06 AM    HCT 27.2 08/04/2022 05:06 AM    HCT 26.8 08/04/2022 05:06 AM    MCV 93.5 08/04/2022 05:06 AM    RDW 15.2 08/04/2022 05:06 AM     08/04/2022 05:06 AM     CMP:    Lab Results   Component Value Date/Time     08/04/2022 05:06 AM    K 4.0 08/04/2022 05:06 AM     08/04/2022 05:06 AM    CO2 19 08/04/2022 05:06 AM    BUN 31 08/04/2022 05:06 AM    CREATININE 0.9 08/04/2022 05:06 AM    GFRAA >60 08/04/2022 05:06 AM    LABGLOM >60 08/04/2022 05:06 AM    GLUCOSE 113 08/04/2022 05:06 AM    CALCIUM 8.0 08/04/2022 05:06 AM     PT/INR:  No results found for: PTINR  Lab Results   Component Value Date    TROPONINI 0.68 ([facility]) 08/04/2022       EKG:  I have reviewed EKG with the following interpretation:  Impression:  NSR, 1st degree AVB, RBBB    Echo: Completed today pending read  2019:   - LVEF 50-55%, D0UB  - RV systolic function normal  - Mild TR    Stress: 2011 normal  Cath: None  MRI/EP/Other: None    Old notes reviewed  Telemetry reviewed  Ekg personally reviewed  Chest xray personally reviewed  Echo, cath, and   Medications and labs reviewed      Assessment/Plan:  Principal Problem:    Shock [facility])    Plan:     NSTEMI likely type II: Troponin plateau, transfer facility Assay likely high sensitivity current measurements in house 0.67 --> 0.68, likely all secondary to demand mismatch in the setting of his septic shock.  - Continue hep gtt x 48 hours  - Continue ASA, Plavix (outpatient regimen)  - Continue Statin therapy as long as liver enzymes are okay  - EKG with RBBB, which is not a STEMI equivalent  - TTE today  - No urgency for coronary angiography at this time, can consider pending his sepsis improvement/AMS and baseline cognitive function        Thank you for allowing to us to participate in the care or [facility]. Further evaluation will be based upon the patient's clinical course and testing results. All questions and concerns were addressed to the patient/family. Alternatives to my treatment were discussed. The note was completed using EMR. Every effort was made to ensure accuracy; however, inadvertent computerized transcription errors may be present.     Sonia Hairston MD, 8/4/2022 12:18 PM

## 2022-08-05 NOTE — PLAN OF CARE
Problem: Discharge Planning  Goal: Discharge to home or other facility with appropriate resources  8/4/2022 2229 by Teresita Lewis RN  Outcome: Progressing  Flowsheets (Taken 8/4/2022 2000)  Discharge to home or other facility with appropriate resources:   Identify barriers to discharge with patient and caregiver   Arrange for needed discharge resources and transportation as appropriate   Identify discharge learning needs (meds, wound care, etc)   Refer to discharge planning if patient needs post-hospital services based on physician order or complex needs related to functional status, cognitive ability or social support system  8/4/2022 1842 by Hermilo De La Fuente RN  Outcome: Progressing     Problem: Safety - Medical Restraint  Goal: Remains free of injury from restraints (Restraint for Interference with Medical Device)  Description: INTERVENTIONS:  1. Determine that other, less restrictive measures have been tried or would not be effective before applying the restraint  2. Evaluate the patient's condition at the time of restraint application  3. Inform patient/family regarding the reason for restraint  4.  Q2H: Monitor safety, psychosocial status, comfort, nutrition and hydration  8/4/2022 2229 by Teresita Lewis RN  Outcome: Progressing  Flowsheets (Taken 8/4/2022 1900)  Remains free of injury from restraints (restraint for interference with medical device):   Determine that other, less restrictive measures have been tried or would not be effective before applying the restraint   Evaluate the patient's condition at the time of restraint application   Inform patient/family regarding the reason for restraint   Every 2 hours: Monitor safety, psychosocial status, comfort, nutrition and hydration  8/4/2022 1842 by Hermilo De La Fuente RN  Outcome: Progressing  Flowsheets (Taken 8/4/2022 0459 by Tamara Sanford RN)  Remains free of injury from restraints (restraint for interference with medical device): Every 2 hours: Monitor safety, psychosocial status, comfort, nutrition and hydration     Problem: Nutrition Deficit:  Goal: Optimize nutritional status  8/4/2022 2229 by Stanton Maldonado RN  Outcome: Progressing  8/4/2022 1842 by Sammie Arango RN  Outcome: Progressing  8/4/2022 1143 by Yesica Morris RD, LD  Outcome: Progressing     Problem: Pain  Goal: Verbalizes/displays adequate comfort level or baseline comfort level  8/4/2022 2229 by Stanton Maldonado RN  Outcome: Progressing  Flowsheets (Taken 8/4/2022 2000)  Verbalizes/displays adequate comfort level or baseline comfort level:   Assess pain using appropriate pain scale   Administer analgesics based on type and severity of pain and evaluate response   Implement non-pharmacological measures as appropriate and evaluate response   Consider cultural and social influences on pain and pain management  8/4/2022 1842 by Sammie Arango RN  Outcome: Progressing     Problem: Skin/Tissue Integrity  Goal: Absence of new skin breakdown  Description: 1. Monitor for areas of redness and/or skin breakdown  2. Assess vascular access sites hourly  3. Every 4-6 hours minimum:  Change oxygen saturation probe site  4. Every 4-6 hours:  If on nasal continuous positive airway pressure, respiratory therapy assess nares and determine need for appliance change or resting period.   8/4/2022 2229 by Stanton Maldonado RN  Outcome: Progressing  8/4/2022 1842 by Sammie Arango RN  Outcome: Progressing     Problem: Safety - Adult  Goal: Free from fall injury  8/4/2022 2229 by Stanton Maldonado RN  Outcome: Progressing  Flowsheets (Taken 8/4/2022 2200)  Free From Fall Injury: Instruct family/caregiver on patient safety  8/4/2022 1842 by Sammie Arango RN  Outcome: Progressing     Problem: ABCDS Injury Assessment  Goal: Absence of physical injury  8/4/2022 2229 by Stanton Maldonado RN  Outcome: Progressing  Flowsheets (Taken 8/4/2022 2200)  Absence of Physical Injury: Implement safety measures based on patient assessment  8/4/2022 1842 by Zuri Mccarthy Akhil Larsen, RN  Outcome: Progressing

## 2022-08-05 NOTE — PROGRESS NOTES
75mL of fentanyl wasted from drip that was discontinued with Vivek Coronado RN. Electronically signed by Kusum Mariscal RN on 8/5/2022 at 2:13 PM   Electronically signed by Deepthi Titus.  Jatinder Trejo RN on 8/5/22 at 2:13 PM

## 2022-08-05 NOTE — PROGRESS NOTES
Clinical Pharmacy Note  Heparin Dosing       Lab Results   Component Value Date/Time    APTT 60.0 08/04/2022 08:22 PM     Lab Results   Component Value Date/Time    HGB 8.9 08/04/2022 05:06 AM    HCT 27.2 08/04/2022 05:06 AM    HCT 26.8 08/04/2022 05:06 AM     08/04/2022 05:06 AM       Current Infusion Rate: 560 units/hr    Plan:  Bolus: 1900 units  Rate: 680 units/hr  Next aPTT: 0400    Pharmacy will continue to monitor and adjust based on aPTT results.

## 2022-08-05 NOTE — CARE COORDINATION
Discharge Planning:  SW contacted Og Shaikh from [place] [address]. Og Shaikh stated that she is pts RN at the group home. Og Shaikh stated that this SW should reach out to Jazmín Zambrano who is oversees the group home. Joleen can be reached at [phone]. Dmdelores confirmed that this pt resides at [facility] which is run through [facility]. Dmdelores confirmed that this pt would be able to return to this group home upon d/c and stated that this facility would be able to provide total care. Dmdelores stated that this pt would be able to receive [facility] services (PT/OT) if needed. Per Nallely Parker, this pt has no family that is active with his care as pt was raised in a state home. Dmdelores stated that there is no legal guardian for this pt.      Joleen stated that the agency does utilize [facility] for guardian services and they will look into this service once this pt is returned home from the hospital.  KANA Hernandez [facility]  [phone]  Electronically signed by Michelle Ruff on 8/5/2022 at 2:00 PM

## 2022-08-05 NOTE — PROGRESS NOTES
Patient placed on PS 5/5 40%. SpO2 96%, Vt 400s, RSBI 47, RR 15. RN aware. Will continue to monitor.

## 2022-08-05 NOTE — PROGRESS NOTES
Hospitalist Progress Note      PCP: Tristian Maldonado MD    Date of Admission: 8/4/2022        Hospital Course: pt  admitted with hypoxia , elevated troponins and AMS, work up and management ongoing. Subjective: pt seen and examined. On vent support, opens eyes and follows commands . Discussed with bedside RN. Pulmo ff for vent management. Medications:  Reviewed    Infusion Medications    norepinephrine Stopped (08/05/22 0824)    sodium chloride       Scheduled Medications    aspirin  81 mg Oral Daily    sodium chloride flush  10 mL IntraVENous 2 times per day    cefepime  2,000 mg IntraVENous Q12H    metroNIDAZOLE  500 mg IntraVENous Q8H    levETIRAcetam  500 mg IntraVENous Q12H    pantoprazole (PROTONIX) 40 mg injection  40 mg IntraVENous Q12H    atorvastatin  40 mg Oral Daily    clopidogrel  75 mg Oral Daily    donepezil  10 mg Oral Nightly    QUEtiapine  50 mg Oral Nightly    tamsulosin  0.8 mg Oral Daily    memantine  10 mg Oral BID    chlorhexidine  15 mL Mouth/Throat BID    lactobacillus  1 capsule Oral BID WC     PRN Meds: fentanNYL, sodium chloride flush, sodium chloride, potassium chloride, magnesium sulfate, promethazine **OR** ondansetron, acetaminophen **OR** acetaminophen, perflutren lipid microspheres      Intake/Output Summary (Last 24 hours) at 8/5/2022 1425  Last data filed at 8/5/2022 1224  Gross per 24 hour   Intake 1359.37 ml   Output 620 ml   Net 739.37 ml       Physical Exam Performed:    BP (!) 122/57   Pulse 71   Temp 98.1 °F (36.7 °C) (Axillary)   Resp 15   Ht 5' 9\" (1.753 m)   Wt 131 lb 9.8 oz (59.7 kg)   SpO2 100%   BMI 19.44 kg/m²     General appearance: No apparent distress, appears stated age and cooperative. HEENT: Pupils equal, round, and reactive to light. Conjunctivae/corneas clear. Respiratory:  Normal respiratory effort. Clear to auscultation, bilaterally without Rales/Wheezes/Rhonchi.   Cardiovascular: Regular rate and rhythm with normal S1/S2   Abdomen: Soft, non-tender, non-distended   Musculoskeletal: No clubbing, cyanosis or edema bilaterally. Neurologic:  grossly non-focal.  Psychiatric: Alert and oriented, thought content appropriate, normal insight        Labs:   Recent Labs     08/04/22  0506 08/05/22  0400   WBC 3.2* 9.0   HGB 8.9* 7.5*   HCT 26.8*  27.2* 22.6*   * 114*     Recent Labs     08/04/22  0506 08/05/22  0400    137   K 4.0 4.0    108   CO2 19* 21   BUN 31* 37*   CREATININE 0.9 0.9   CALCIUM 8.0* 8.0*   PHOS 3.0 2.5     Recent Labs     08/04/22  1335 08/05/22  0400   AST 80* 63*   ALT 44* 39   BILIDIR <0.2  --    BILITOT 0.6 0.4   ALKPHOS 41 38*     No results for input(s): INR in the last 72 hours. Recent Labs     08/04/22  0506 08/04/22  1045   TROPONINI 0.67* 0.68*       Urinalysis:      Lab Results   Component Value Date/Time    NITRU Negative 08/04/2022 10:45 AM    WBCUA 2 08/04/2022 10:45 AM    BACTERIA None Seen 08/04/2022 10:45 AM    RBCUA 4 08/04/2022 10:45 AM    BLOODU Negative 08/04/2022 10:45 AM    SPECGRAV 1.064 08/04/2022 10:45 AM    GLUCOSEU Negative 08/04/2022 10:45 AM       Radiology:  XR CHEST PORTABLE   Final Result   Tubes as above. Worsening bilateral interstitial opacities. XR CHEST PORTABLE   Final Result   Tip of endotracheal tube is high in position, estimated 7 cm proximal to the   farheen. Suggest repositioning. Airspace disease within the mid to lower lung zones bilaterally, right   greater than left. Small right pleural effusion. Findings were called by the radiology call center.                  Assessment/Plan:    Active Hospital Problems    Diagnosis     Septic shock ([facility] [address] [A41.9, R65.21]      Priority: Medium    Acute respiratory failure with hypoxia (HCC) [J96.01]      Priority: Medium    Pneumonia of right lower lobe due to infectious organism [J18.9]      Priority: Medium    Metabolic encephalopathy [P53.25]      Priority: Medium    NSTEMI (non-ST elevated myocardial infarction) ([facility] [address] [I21.4]      Priority: Medium     ACute resp failure with hypoxia : intubated on admission   Right sided PNA : ? Aspiration , likely etiology of above. S/p bronch by pulmo; obtained purulent secretions. Follow up cultures. Cont empiric abx.   Hypotensive shock on admission requiring pressors   Sepsis due to Right sided PNA, POA with associated end organ failure as shown by acute resp failure with hypoxia   Elevated tropo : Nstemi type 1 vrs type 2 : echo this admission when compared to ECHO from 2019 in care everywhere with new wall motion abnormality in apex and distal inferior walls, reduced EF 35-40% compared to 55-60%  Cardiology ff , cont cardiac meds, heparin drip  IVF hydration   Cont home meds  Monitor BP  Monitor labs     DVT Prophylaxis: heparin drip  Diet: Diet NPO Exceptions are: Ice Chips, Sips of Water with Meds  Code Status: Full Code        Dispo - possible dc back to group home vrs vrs ECF in 3-4 days pending progress     Shena Reyna MD

## 2022-08-05 NOTE — CONSULTS
[facility]  Palliative Care   Consult Note    NAME:  Dariel Ga RECORD NUMBER:  [id number]  AGE: [de-identified] y.o. GENDER: male  : 1940  TODAY'S DATE:  2022    Subjective     Reason for Consult:  goals of care  Visit Type: Initial Consult      Derik Barajas is a [de-identified] y.o. male referred by:   [x] Physician    PAST MEDICAL HISTORY      Diagnosis Date    Alzheimer's disease     lives in group home    Blepharitis     CHF (congestive heart failure) ([facility] [address]     Nuc GXT 07 normal. Echo 08 normal LVEF    CVA (cerebral infarction)     stable    Epilepsy ([facility] [address]     Hyperlipidemia     rec semi annual lipids with LDL goal <70    Hypertension     Impulse control disorder in adult     Mental retardation     Mitral insufficiency     Echo 08 mild MR    Osteoarthritis     Prostatic hyperplasia     Psychosis     Tobacco user     cessation discussed       PAST SURGICAL HISTORY  No past surgical history on file. FAMILY HISTORY  No family history on file. SOCIAL HISTORY  Social History     Tobacco Use    Smoking status: Former     Packs/day: 0.50     Years: 50.00     Pack years: 25.00     Types: Cigars, Cigarettes     Quit date: 2013     Years since quittin.0    Smokeless tobacco: Never   Substance Use Topics    Alcohol use: No    Drug use: No       ALLERGIES  Allergies   Allergen Reactions    Chlorpromazine        MEDICATIONS  No current facility-administered medications on file prior to encounter. Current Outpatient Medications on File Prior to Encounter   Medication Sig Dispense Refill    acetaminophen (TYLENOL) 500 MG tablet Take 500 mg by mouth daily as needed for Pain      ferrous sulfate 324 (65 Fe) MG EC tablet Take 324 mg by mouth in the morning. finasteride (PROSCAR) 5 MG tablet Take 5 mg by mouth in the morning.       fluticasone (FLONASE) 50 MCG/ACT nasal spray 1 spray by Each Nostril route daily      ipratropium (ATROVENT) 0.03 % nasal spray 2 sprays by Each Nostril route 3 times daily      metoprolol succinate (TOPROL XL) 25 MG extended release tablet Take 12.5 mg by mouth at bedtime      alendronate (FOSAMAX) 70 MG tablet Take 70 mg by mouth every 7 days Takes on Monday      enalapril (VASOTEC) 10 MG tablet Take 10 mg by mouth daily      furosemide (LASIX) 20 MG tablet Take 20 mg by mouth every 72 hours      Loratadine 10 MG CAPS Take 10 mg by mouth daily as needed (allergies)      potassium chloride (MICRO-K) 10 MEQ CR capsule Take 10 mEq by mouth every 72 hours      QUEtiapine (SEROQUEL) 50 MG tablet Take 50 mg by mouth at bedtime      tamsulosin (FLOMAX) 0.4 MG capsule Take 0.8 mg by mouth daily      traZODone (DESYREL) 50 MG tablet Take 25 mg by mouth nightly      vitamin E 400 UNIT capsule Take 400 Units by mouth daily      levetiracetam (KEPPRA) 500 MG tablet Take 500 mg by mouth 2 times daily. ibuprofen (ADVIL;MOTRIN) 400 MG tablet Take 400 mg by mouth in the morning and at bedtime      calcium-vitamin D (OSCAL-500) 500-200 MG-UNIT per tablet Take 1 tablet by mouth daily. clopidogrel (PLAVIX) 75 MG tablet Take 75 mg by mouth daily. donepezil (ARICEPT) 10 MG tablet Take 10 mg by mouth nightly. aspirin 81 MG EC tablet Take 81 mg by mouth daily. memantine (NAMENDA) 10 MG tablet Take 10 mg by mouth in the morning and 10 mg before bedtime. .      rosuvastatin (CRESTOR) 10 MG tablet Take 1 tablet by mouth daily.  30 tablet 11       Objective         BP (!) 110/56   Pulse 82   Temp 99.3 °F (37.4 °C) (Axillary)   Resp 28   Ht 5' 9\" (1.753 m)   Wt 131 lb 9.8 oz (59.7 kg)   SpO2 99%   BMI 19.44 kg/m²     Code Status: Full Code    Advanced Directives: not completed, per group home has sister Mitch Smith [phone] but years ago she stated she did not want to be involved with her brother     Assessment        Management and Education    Persons available for education:        [] Self     [] Caregiver       [] Spouse       [] Other Family Member   [x]  Other    Spiritual History:  notified: Yes,     Does the patient have a Primary Care Physician? Yes    Level of patient/caregiver understanding able to:        [x] Verbalize Understanding   [] Demonstrate Understanding       [] Teach Back       [] Needs Reinforcement     []  Other:      Teaching Time:  1hours  0 minutes     Plan        Social Service Consult Made:  Yes  Assistance filling out Living Will/HPOA:  No      Discharge Plan:  Palliative care orders introduced    Discharge Environment:    [x] Other: lives in group home     Discussion: Patient transferred from outside hospital, he was at [facility] for low oxygen levels and hypotension, troponin was elevated, he was also intubated tried to transfer to  or [facility] beds now here at [facility]. Patient remains on vent, no sedation still with weak cough unable to be extubated. I have reached out to the staff at [facility] where he lives. They state he has a sister but she did not want to be his HPOA and his PCP felt he could be his own decision maker. They give a history of him being independent in ADL's walks well with walker, listens to music but has had a 30# weight loss of last few months. He has always lived in some type of institution since he was a baby per their records. I did attempt to call his sister as listed above she stated the connection was bad and hung up, I called back and left a message. The group home has a guardianship service they use as needed if he does not come off vent or can not express his wishes after coming off vent. I will continue to follow Mr. Morales's care as needed. Thank you for allowing me to participate in the care of Mr. Mamadou Bender .      Electronically signed by Lalitha Montanez RN, BSN, [address] on 8/5/2022 at 11:34 AM  Palliative Care Nurse [facility]  Office: [phone]

## 2022-08-05 NOTE — PROGRESS NOTES
[facility]    Admit Date: 2022    PCP: Valerie Reese MD                  : 1940  MRN: [id number]    Subjective: Interval History: Patient doing well, off of all pressors, remains intubated does follow limited commands for me    Review of Systems   Unable to perform ROS: Intubated     Data:   Scheduled Meds:   aspirin  81 mg Oral Daily    sodium chloride flush  10 mL IntraVENous 2 times per day    cefepime  2,000 mg IntraVENous Q12H    metroNIDAZOLE  500 mg IntraVENous Q8H    levETIRAcetam  500 mg IntraVENous Q12H    pantoprazole (PROTONIX) 40 mg injection  40 mg IntraVENous Q12H    atorvastatin  40 mg Oral Daily    clopidogrel  75 mg Oral Daily    donepezil  10 mg Oral Nightly    QUEtiapine  50 mg Oral Nightly    tamsulosin  0.8 mg Oral Daily    memantine  10 mg Oral BID    chlorhexidine  15 mL Mouth/Throat BID    lactobacillus  1 capsule Oral BID WC     PRN Meds:fentanNYL, sodium chloride flush, sodium chloride, potassium chloride, magnesium sulfate, promethazine **OR** ondansetron, acetaminophen **OR** acetaminophen, perflutren lipid microspheres  I/O last 3 completed shifts: In: 2613.9 [I.V.:353.9; NG/GT:60; IV Piggyback:2200]  Out: 430 [Urine:712]  I/O this shift:  In: 208.6 [I.V.:94.3; NG/GT:60; IV Piggyback:54.3]  Out: 270 [Urine:270]    Intake/Output Summary (Last 24 hours) at 2022 1315  Last data filed at 2022 1224  Gross per 24 hour   Intake 1359.37 ml   Output 720 ml   Net 639.37 ml           Objective:     Vitals: BP (!) 122/57   Pulse 71   Temp 98.1 °F (36.7 °C) (Axillary)   Resp 15   Ht 5' 9\" (1.753 m)   Wt 131 lb 9.8 oz (59.7 kg)   SpO2 100%   BMI 19.44 kg/m²     Physical Exam  General appearance:  Ill appearing [de-identified]y.o. year-old male who is intubated and on mechanical ventilation, in no distress  Head: Normocephalic, no lesions, without obvious abnormality. Eye: PERRL  ENT:  ET Tube in place.  Normal external ears and nose, moist mucus membranes, No nasal discharge, unable to visualize Pharynx. Neck: no adenopathy, no carotid bruit, no JVD  Lungs: coarse mechanical breath sounds on auscultation bilaterally  Heart: regular rate and rhythm, S1, S2 normal  Abdomen: soft, no grimace with deep palpation; Extremities: extremities normal without joint deformity or clubbing, atraumatic, no cyanosis, no cords appreciated  Skin: Warm and dry. Mental status: Sedated. Neurologic:  Cranial nerves II-XII are grossly non focal on limited exam. + Pupillary response. LABS:    CBC:   Recent Labs     08/04/22  0506 08/05/22  0400   WBC 3.2* 9.0   HGB 8.9* 7.5*   HCT 26.8*  27.2* 22.6*   MCV 93.5 91.3   * 114*                                                                BMP:    Recent Labs     08/04/22  0506 08/05/22  0400    137   K 4.0 4.0    108   CO2 19* 21   BUN 31* 37*   CREATININE 0.9 0.9   GLUCOSE 113* 113*       LFT's:   Recent Labs     08/04/22  1335 08/05/22  0400   AST 80* 63*   ALT 44* 39   BILITOT 0.6 0.4   ALKPHOS 41 38*       Troponin:   Recent Labs     08/04/22  0506 08/04/22  1045   TROPONINI 0.67* 0.68*       BNP: No results for input(s): BNP in the last 72 hours. Lipids: No results for input(s): CHOL, HDL in the last 72 hours. Invalid input(s): LDLCALCU    INR: No results for input(s): INR in the last 72 hours. -----------------------------------------------------------------  RAD:   XR CHEST PORTABLE   Final Result   Tubes as above. Worsening bilateral interstitial opacities. XR CHEST PORTABLE   Final Result   Tip of endotracheal tube is high in position, estimated 7 cm proximal to the   farheen. Suggest repositioning. Airspace disease within the mid to lower lung zones bilaterally, right   greater than left. Small right pleural effusion. Findings were called by the radiology call center.              EKG:  I have reviewed EKG with the following interpretation:  Impression:  NSR, 1st degree AVB, RBBB Echo: Completed today pending read  2019:  - LVEF 50-55%, B4KG  - RV systolic function normal  - Mild TR     Stress: 2011 normal  Cath: None  MRI/EP/Other: None     Old notes reviewed  Telemetry reviewed  Ekg personally reviewed  Chest xray personally reviewed  Echo, cath, and  Medications and labs reviewed  Assessment/Plan:       NSTEMI type II 2/2 to sepsis/pneumonia: Troponin plateau, transfer facility Assay likely high sensitivity current measurements in house 0.67 --> 0.68, likely all secondary to demand mismatch in the setting of his septic shock.  - Okay to d/c hep gtt  - Continue ASA, Plavix (outpatient regimen)  - Continue Statin therapy as long as liver enzymes are okay  - No urgency for coronary angiography at this time, can consider pending his sepsis improvement/AMS and baseline cognitive function    Cardiomyopathy: TTE performed with reduced LVEF 35-40% which is reduced from his previous ECHO 2019  - Will not start GDMT for HF in setting of his sepsis, just came off of levophed, will start to uptitrate starting in the next few days  - As above ischemic evaluation either MPI vs Cor angio pending his AMS improvement/baseline dementia.          Luz Ibarra MD  8/5/2022  1:15 PM

## 2022-08-05 NOTE — PLAN OF CARE
Problem: Nutrition Deficit:  Goal: Optimize nutritional status  Outcome: Not Progressing     Problem: Safety - Medical Restraint  Goal: Remains free of injury from restraints (Restraint for Interference with Medical Device)  Description: INTERVENTIONS:  1. Determine that other, less restrictive measures have been tried or would not be effective before applying the restraint  2. Evaluate the patient's condition at the time of restraint application  3. Inform patient/family regarding the reason for restraint  4. Q2H: Monitor safety, psychosocial status, comfort, nutrition and hydration  Outcome: Progressing     Problem: Pain  Goal: Verbalizes/displays adequate comfort level or baseline comfort level  Outcome: Progressing     Problem: Skin/Tissue Integrity  Goal: Absence of new skin breakdown  Description: 1. Monitor for areas of redness and/or skin breakdown  2. Assess vascular access sites hourly  3. Every 4-6 hours minimum:  Change oxygen saturation probe site  4. Every 4-6 hours:  If on nasal continuous positive airway pressure, respiratory therapy assess nares and determine need for appliance change or resting period.   Outcome: Progressing     Problem: Safety - Adult  Goal: Free from fall injury  Outcome: Progressing     Problem: ABCDS Injury Assessment  Goal: Absence of physical injury  Outcome: Progressing     Problem: Nutrition Deficit:  Goal: Optimize nutritional status  Outcome: Not Progressing

## 2022-08-05 NOTE — PROGRESS NOTES
Pulmonary Progress Note    CC:  Follow up respiratory failure, pneumonia, shock    Subjective:  Remains on vent with FIO2 at 40%  Bronchoscopy yesterday with purulent secretions in the RLL  Remains on low dose pressors. Became hypotensive after bronchoscopy which may have been related to sedatives given  Procal was >2      Intake/Output Summary (Last 24 hours) at 8/5/2022 0735  Last data filed at 8/5/2022 0701  Gross per 24 hour   Intake 2652.47 ml   Output 637 ml   Net 2015.47 ml         PHYSICAL EXAM:  Blood pressure (!) 118/59, pulse 67, temperature 98.7 °F (37.1 °C), temperature source Axillary, resp. rate 15, height 5' 9\" (1.753 m), weight 131 lb 9.8 oz (59.7 kg), SpO2 98 %.'  Gen: Awakens easily to verbal   Eyes: PERRL. No sclera icterus. No conjunctival injection. ENT: No discharge. Pharynx with ETT and gastric tube  Neck: Trachea midline. No obvious mass. Resp: Faint RLL crackles   CV: Regular rate. Regular rhythm. No murmur or rub. GI: Non-tender. Non-distended. No hernia. Skin: Warm, dry, normal texture and turgor. No nodule on exposed extremities. Lymph: No cervical LAD. No supraclavicular LAD. M/S: No cyanosis. No clubbing. No joint deformity. Neuro:  Will follow commands, cough is very weak   Ext:   no edema    Medications:    Scheduled Meds:   sodium chloride flush  10 mL IntraVENous 2 times per day    cefepime  2,000 mg IntraVENous Q12H    metroNIDAZOLE  500 mg IntraVENous Q8H    levETIRAcetam  500 mg IntraVENous Q12H    vancomycin (VANCOCIN) intermittent dosing (placeholder)   Other RX Placeholder    pantoprazole (PROTONIX) 40 mg injection  40 mg IntraVENous Q12H    atorvastatin  40 mg Oral Daily    aspirin  81 mg Oral Daily    clopidogrel  75 mg Oral Daily    donepezil  10 mg Oral Nightly    QUEtiapine  50 mg Oral Nightly    tamsulosin  0.8 mg Oral Daily    memantine  10 mg Oral BID    chlorhexidine  15 mL Mouth/Throat BID    lactobacillus  1 capsule Oral BID WC       Continuous Infusions:   sodium chloride      norepinephrine 1 mcg/min (22)    fentaNYL 25 mcg/hr (22)    heparin (PORCINE) Infusion 680 Units/hr (22)       PRN Meds:  sodium chloride flush, sodium chloride, potassium chloride, magnesium sulfate, promethazine **OR** ondansetron, acetaminophen **OR** acetaminophen, perflutren lipid microspheres    Labs:  CBC:   Recent Labs     22  0506 22  0400   WBC 3.2* 9.0   HGB 8.9* 7.5*   HCT 26.8*  27.2* 22.6*   MCV 93.5 91.3   * 114*     BMP:   Recent Labs     22  0506 22  0400    137   K 4.0 4.0    108   CO2 19* 21   PHOS 3.0  --    BUN 31* 37*   CREATININE 0.9 0.9     LIVER PROFILE:   Recent Labs     22  1335 22  0400   AST 80* 63*   ALT 44* 39   BILIDIR <0.2  --    BILITOT 0.6 0.4   ALKPHOS 41 38*     PT/INR: No results for input(s): PROTIME, INR in the last 72 hours. APTT:   Recent Labs     22  1145 22  0400   APTT 166.2* 60.0* 82.6*     UA:  Recent Labs     22  1045   COLORU Yellow   PHUR 5.5   WBCUA 2   RBCUA 4   BACTERIA None Seen   CLARITYU Clear   SPECGRAV 1.064   LEUKOCYTESUR Negative   UROBILINOGEN 0.2   BILIRUBINUR Negative   BLOODU Negative   GLUCOSEU Negative     No results for input(s): PH, PCO2, PO2 in the last 72 hours.         Films:  Chest imaging reports were reviewed and imaging was reviewed by me and showed no new chest films     AB.38/39/104    Cultures:  MRSA probe:  negative  BAL:  pending   Blood:  pending    I reviewed the labs and images listed above    Assessment/Plan:   Acute Hypoxic/Hypercapnic Respiratory Failure with saturations less than 90% on room air and pH less than 7.35, due to pneumonia  SBT today (weak cough may limit ability to extubate), ABG in 30 min  Septic Shock   Levophed for MAP of 60  Avoid additional IV fluids as EF is suppressed   Continue with Cefepime and Flagyl  Discontinue Vanco since MRSA probe was negative

## 2022-08-05 NOTE — PROGRESS NOTES
NOTE IS FOR TEACHING PURPOSES ONLY. SEE FULL NOTE BY DR. Fred Padilla,   [facility]monary, Sleep and Critical Care  [phone]      Pulmonary Progress Note    CC: respiratory failure, pneumonia, shock    Subjective:    Mary Kay is an [de-identified] y/o male who is here for confusion. Per nurses, his central line dressing has been saturated. Patient was taken off pressors today. Unable to obtain history from patient because sedated. HPI contained by nurses. Intake/Output Summary (Last 24 hours) at 8/5/2022 0703  Last data filed at 8/5/2022 0701  Gross per 24 hour   Intake 2652.47 ml   Output 637 ml   Net 2015.47 ml         PHYSICAL EXAM:  Blood pressure (!) 118/59, pulse 67, temperature 98.7 °F (37.1 °C), temperature source Axillary, resp. rate 15, height 5' 9\" (1.753 m), weight 131 lb 9.8 oz (59.7 kg), SpO2 98 %.'  Gen: Chronically ill-appearing, sedated. Eyes: No sclera icterus. No conjunctival injection. ENT: Intubated, GTT in place  Resp: Crackles to right  lower lobe. No wheezes. No rhonchi. CV: Regular rate. Regular rhythm. No murmur or rub. BP cuff to right upper extremity. Skin: Warm, dry, normal texture and turgor. No nodule on exposed extremities. Healed 2 cm wound to dorsal aspect of right foot. Bandages wrapped around heels of feet bilaterally. 1 IV placed in each extremity. M/S: No cyanosis. No joint deformity. Neuro: Responds to commands. Eyes open to commands.    Ext:   no edema    Medications:    Scheduled Meds:   sodium chloride flush  10 mL IntraVENous 2 times per day    cefepime  2,000 mg IntraVENous Q12H    metroNIDAZOLE  500 mg IntraVENous Q8H    levETIRAcetam  500 mg IntraVENous Q12H    vancomycin (VANCOCIN) intermittent dosing (placeholder)   Other RX Placeholder    pantoprazole (PROTONIX) 40 mg injection  40 mg IntraVENous Q12H    atorvastatin  40 mg Oral Daily    aspirin  81 mg Oral Daily    clopidogrel  75 mg Oral Daily    donepezil  10 mg Oral Nightly    QUEtiapine 50 mg Oral Nightly    tamsulosin  0.8 mg Oral Daily    memantine  10 mg Oral BID    chlorhexidine  15 mL Mouth/Throat BID    lactobacillus  1 capsule Oral BID WC       Continuous Infusions:   sodium chloride      norepinephrine 1 mcg/min (08/05/22 0701)    fentaNYL 25 mcg/hr (08/05/22 0701)    heparin (PORCINE) Infusion 680 Units/hr (08/05/22 0701)       PRN Meds:  sodium chloride flush, sodium chloride, potassium chloride, magnesium sulfate, promethazine **OR** ondansetron, acetaminophen **OR** acetaminophen, perflutren lipid microspheres    Labs:  CBC:   Recent Labs     08/04/22  0506 08/05/22  0400   WBC 3.2* 9.0   HGB 8.9* 7.5*   HCT 26.8*  27.2* 22.6*   MCV 93.5 91.3   * 114*     BMP:   Recent Labs     08/04/22  0506 08/05/22  0400    137   K 4.0 4.0    108   CO2 19* 21   PHOS 3.0  --    BUN 31* 37*   CREATININE 0.9 0.9     LIVER PROFILE:   Recent Labs     08/04/22  1335 08/05/22  0400   AST 80* 63*   ALT 44* 39   BILIDIR <0.2  --    BILITOT 0.6 0.4   ALKPHOS 41 38*     PT/INR: No results for input(s): PROTIME, INR in the last 72 hours. APTT:   Recent Labs     08/04/22  1145 08/04/22 2022 08/05/22  0400   APTT 166.2* 60.0* 82.6*     UA:  Recent Labs     08/04/22  1045   COLORU Yellow   PHUR 5.5   WBCUA 2   RBCUA 4   BACTERIA None Seen   CLARITYU Clear   SPECGRAV 1.064   LEUKOCYTESUR Negative   UROBILINOGEN 0.2   BILIRUBINUR Negative   BLOODU Negative   GLUCOSEU Negative     No results for input(s): PH, PCO2, PO2 in the last 72 hours. Films:  Chest imaging reports were reviewed and imaging was reviewed by me and showed no new chest films    ABG:  Arterial Blood Gas result:  pO2 90.7; pCO2 38.7; pH 7.398;  HCO3 23.8, %O2 Sat 98.1.       Cultures:  Blood: pending  Fungus: pending  Lactobacillus: pending  BAL cultures: pending    I reviewed the labs and images listed above    Assessment/Plan:   Acute hypoxia/hypercapnic respiratory failure with saturations < 90% on room air and pH < 7.35  SBT today (patient had weak cough), repeat ABG, continue to monitor  Septic shock  Levophed for MAP 60  RLL pneumonia, risk for gram negative due to aspiration  Cefepime and flagyl  Repeat procal  Lactobacillus  BAL cultures  Low glucose  Decrease insulin dose  Recheck   Metabolic encephalopathy with underlying dementia  Same management as above  NSTEMI  Heparin gtt  Trend troponin  Cardiomyopathy with EF 35-45%  Consider intermittent diuresis based on O2 needs      GI prophylaxis  heparin  DVT prophylaxis  pantoprazole    NOTE IS FOR TEACHING PURPOSES ONLY.   SEE FULL NOTE BY DR. Rob Davila, [facility] Pulmonary

## 2022-08-05 NOTE — PROGRESS NOTES
Clinical Pharmacy Note  Heparin Dosing       Lab Results   Component Value Date/Time    APTT 82.6 08/05/2022 04:00 AM     Lab Results   Component Value Date/Time    HGB 7.5 08/05/2022 04:00 AM    HCT 22.6 08/05/2022 04:00 AM     08/05/2022 04:00 AM       Current Infusion Rate: 680 units/hr    Plan:  -Rate:Continue at 680 units/hr  -Next aPTT: 1000    Pharmacy will continue to monitor and adjust based on aPTT results.   Kristina Marcelino PharmMADELAINE  8/5/2022 5:27 AM

## 2022-08-06 NOTE — PLAN OF CARE
Problem: Discharge Planning  Goal: Discharge to home or other facility with appropriate resources  Outcome: Progressing  Flowsheets (Taken 8/5/2022 1900)  Discharge to home or other facility with appropriate resources:   Identify barriers to discharge with patient and caregiver   Arrange for needed discharge resources and transportation as appropriate   Identify discharge learning needs (meds, wound care, etc)     Problem: Safety - Medical Restraint  Goal: Remains free of injury from restraints (Restraint for Interference with Medical Device)  Description: INTERVENTIONS:  1. Determine that other, less restrictive measures have been tried or would not be effective before applying the restraint  2. Evaluate the patient's condition at the time of restraint application  3. Inform patient/family regarding the reason for restraint  4.  Q2H: Monitor safety, psychosocial status, comfort, nutrition and hydration  8/5/2022 2038 by Nae Payne RN  Outcome: Progressing  Flowsheets (Taken 8/5/2022 2000)  Remains free of injury from restraints (restraint for interference with medical device):   Evaluate the patient's condition at the time of restraint application   Inform patient/family regarding the reason for restraint   Every 2 hours: Monitor safety, psychosocial status, comfort, nutrition and hydration  8/5/2022 1715 by Ravi Moore RN  Outcome: Progressing     Problem: Nutrition Deficit:  Goal: Optimize nutritional status  8/5/2022 2038 by Nae Payne RN  Outcome: Progressing  8/5/2022 1715 by Ravi Moore RN  Outcome: Not Progressing     Problem: Pain  Goal: Verbalizes/displays adequate comfort level or baseline comfort level  8/5/2022 2038 by Nae Payne RN  Outcome: Progressing  Flowsheets (Taken 8/5/2022 2000)  Verbalizes/displays adequate comfort level or baseline comfort level:   Assess pain using appropriate pain scale   Administer analgesics based on type and severity of pain and evaluate response

## 2022-08-06 NOTE — PROGRESS NOTES
Perfect serve sent to hospitalist in regards to irregular HR that remains after PRN Fent given. RN to obtain EKG.

## 2022-08-06 NOTE — PROGRESS NOTES
Pulmonary Progress Note    CC:  Follow up respiratory failure, pneumonia, shock    Subjective:  Completed SBT yesterday but cough was not strong enough for extubation  FIO2 at 40% and PEEP at 5  Remains in PS mode      Intake/Output Summary (Last 24 hours) at 8/6/2022 0713  Last data filed at 8/6/2022 0503  Gross per 24 hour   Intake 676.52 ml   Output 1235 ml   Net -558.48 ml           PHYSICAL EXAM:  Blood pressure (!) 146/67, pulse 86, temperature 99.4 °F (37.4 °C), temperature source Oral, resp. rate 11, height 5' 9\" (1.753 m), weight 138 lb 3.7 oz (62.7 kg), SpO2 95 %.'  Gen: More lethargic   Eyes: PERRL. No sclera icterus. No conjunctival injection. ENT: No discharge. Pharynx with ETT and gastric tube  Neck: Trachea midline. No obvious mass. Resp: Crackles, excessive   CV: Regular rate. Regular rhythm. No murmur or rub. GI: Non-tender. Non-distended. No hernia. Skin: Warm, dry, normal texture and turgor. No nodule on exposed extremities. Lymph: No cervical LAD. No supraclavicular LAD. M/S: No cyanosis. No clubbing. No joint deformity.     Neuro: less responsive today, slightly improved cough   Ext:   no edema    Medications:    Scheduled Meds:   aspirin  81 mg Oral Daily    sodium chloride flush  10 mL IntraVENous 2 times per day    cefepime  2,000 mg IntraVENous Q12H    metroNIDAZOLE  500 mg IntraVENous Q8H    levETIRAcetam  500 mg IntraVENous Q12H    pantoprazole (PROTONIX) 40 mg injection  40 mg IntraVENous Q12H    atorvastatin  40 mg Oral Daily    clopidogrel  75 mg Oral Daily    donepezil  10 mg Oral Nightly    QUEtiapine  50 mg Oral Nightly    tamsulosin  0.8 mg Oral Daily    memantine  10 mg Oral BID    chlorhexidine  15 mL Mouth/Throat BID    lactobacillus  1 capsule Oral BID WC       Continuous Infusions:   norepinephrine Stopped (08/05/22 0824)    sodium chloride         PRN Meds:  fentanNYL, sodium chloride flush, sodium chloride, potassium chloride, magnesium sulfate, promethazine **OR** ondansetron, acetaminophen **OR** acetaminophen, perflutren lipid microspheres    Labs:  CBC:   Recent Labs     22  0506 22  0400 22  0416   WBC 3.2* 9.0 10.2   HGB 8.9* 7.5* 7.8*   HCT 26.8*  27.2* 22.6* 23.0*   MCV 93.5 91.3 91.4   * 114* 128*       BMP:   Recent Labs     22  0506 22  0400 22  0416    137 139   K 4.0 4.0 3.6    108 108   CO2 19* 21 22   PHOS 3.0 2.5 1.5*   BUN 31* 37* 34*   CREATININE 0.9 0.9 0.8       LIVER PROFILE:   Recent Labs     22  1335 22  0400 22  0416   AST 80* 63* 44*   ALT 44* 39 38   BILIDIR <0.2  --   --    BILITOT 0.6 0.4 0.4   ALKPHOS 41 38* 45       PT/INR: No results for input(s): PROTIME, INR in the last 72 hours. APTT:   Recent Labs     22  0400 22  1106   APTT 60.0* 82.6* 78.2*       UA:  Recent Labs     22  1045   COLORU Yellow   PHUR 5.5   WBCUA 2   RBCUA 4   BACTERIA None Seen   CLARITYU Clear   SPECGRAV 1.064   LEUKOCYTESUR Negative   UROBILINOGEN 0.2   BILIRUBINUR Negative   BLOODU Negative   GLUCOSEU Negative       No results for input(s): PH, PCO2, PO2 in the last 72 hours. Films:  Chest imaging reports were reviewed and imaging was reviewed by me and showed no new chest films     AB.38/39/104    Cultures:  MRSA probe:  negative  BAL:  pending   Blood:  pending    I reviewed the labs and images listed above    Assessment/Plan:   Acute Hypoxic/Hypercapnic Respiratory Failure with saturations less than 90% on room air and pH less than 7.35, due to pneumonia  ABG now, unable to extubated due to poor cough and excessive secretions.     Septic Shock   Off levophed  Continue with Cefepime and Flagyl  RLL Pneumonia,, risk for gram negative due to aspiration   Cefepime and flagyl  Procal trend   Lactobacillus  BAL cultures in process   Metabolic Encephalopathy with underlying dementia   NSTEMI (peak troponin was 5)  No intervention required per Cardiology  Cardiomyopathy with EF 35-40%  May require intermittent diuresis based on oxygen needs    Start enteral feeding  Change OG to NG which may help with extubation for airway protection    GI prophylaxis  Protonix  DVT prophylaxis  Add lovenox     Add Phosphorous protocol    Critical care time of 31 minutes. This does not include procedural time. Please see procedural notes for details.     Vel Coombs DO  [facility]

## 2022-08-06 NOTE — CONSULTS
Brief Nutrition Note     Consult for tube feed ordering and management. Order placed, see RD note from 8/4 for full recommendations.       Electronically signed by Jannie Garza RD, KARINA on 8/6/2022 at 9:54 AM

## 2022-08-06 NOTE — PROGRESS NOTES
Pt returned from CT scan. While in CT scan pt HR went from SR to a tachy rhythym irregular with -150s. Appears to be Afib on monitor. Pt is also kicking legs and restless in bed post CT scan.  Pt with large amount of bloody secretions in ET Tube suctioned by RT Angélica Truong

## 2022-08-06 NOTE — PROGRESS NOTES
Communicated results of EKG to MD Severino. New orders received. Pt remains in Afib with -120s at rest and HR up to 140s with any stimulation.

## 2022-08-06 NOTE — PROGRESS NOTES
Hospitalist Progress Note      PCP: Faustino Mello MD    Date of Admission: 8/4/2022        Hospital Course: pt  admitted with hypoxia , elevated troponins and AMS, has Right lower and middle lobe PNA with acute resp failure with hypoxia. Also has elevated troponins. work up and management ongoing. Subjective: Pt seen and examined. Still intubated, poor cough reflex. Discussed with bedside RN. Pulmo ff. Has bloody secretions from suctioning. Monitor closely.        Medications:  Reviewed    Infusion Medications    norepinephrine Stopped (08/05/22 0824)    sodium chloride       Scheduled Medications    enoxaparin  40 mg SubCUTAneous Daily    aspirin  81 mg Oral Daily    sodium chloride flush  10 mL IntraVENous 2 times per day    cefepime  2,000 mg IntraVENous Q12H    metroNIDAZOLE  500 mg IntraVENous Q8H    levETIRAcetam  500 mg IntraVENous Q12H    pantoprazole (PROTONIX) 40 mg injection  40 mg IntraVENous Q12H    atorvastatin  40 mg Oral Daily    clopidogrel  75 mg Oral Daily    donepezil  10 mg Oral Nightly    QUEtiapine  50 mg Oral Nightly    tamsulosin  0.8 mg Oral Daily    memantine  10 mg Oral BID    chlorhexidine  15 mL Mouth/Throat BID    lactobacillus  1 capsule Oral BID      PRN Meds: sodium phosphate IVPB **OR** sodium phosphate IVPB, fentanNYL, sodium chloride flush, sodium chloride, potassium chloride, magnesium sulfate, promethazine **OR** ondansetron, acetaminophen **OR** acetaminophen, perflutren lipid microspheres      Intake/Output Summary (Last 24 hours) at 8/6/2022 1053  Last data filed at 8/6/2022 1020  Gross per 24 hour   Intake 671.58 ml   Output 1530 ml   Net -858.42 ml         Physical Exam Performed:    BP (!) 118/56   Pulse 84   Temp 99.1 °F (37.3 °C) (Oral)   Resp 12   Ht 5' 9\" (1.753 m)   Wt 138 lb 3.7 oz (62.7 kg)   SpO2 97%   BMI 20.41 kg/m²     Intubated on vent support  Wakes easily , follows commands  CTA b/l  CVS s1 and s2, rrr  Abdomen soft and non tender   No edema       Labs:   Recent Labs     08/04/22  0506 08/05/22  0400 08/06/22  0416   WBC 3.2* 9.0 10.2   HGB 8.9* 7.5* 7.8*   HCT 26.8*  27.2* 22.6* 23.0*   * 114* 128*       Recent Labs     08/04/22  0506 08/05/22  0400 08/06/22  0416    137 139   K 4.0 4.0 3.6    108 108   CO2 19* 21 22   BUN 31* 37* 34*   CREATININE 0.9 0.9 0.8   CALCIUM 8.0* 8.0* 7.8*   PHOS 3.0 2.5 1.5*       Recent Labs     08/04/22  1335 08/05/22  0400 08/06/22  0416   AST 80* 63* 44*   ALT 44* 39 38   BILIDIR <0.2  --   --    BILITOT 0.6 0.4 0.4   ALKPHOS 41 38* 45       No results for input(s): INR in the last 72 hours. Recent Labs     08/04/22  0506 08/04/22  1045   TROPONINI 0.67* 0.68*         Urinalysis:      Lab Results   Component Value Date/Time    NITRU Negative 08/04/2022 10:45 AM    WBCUA 2 08/04/2022 10:45 AM    BACTERIA None Seen 08/04/2022 10:45 AM    RBCUA 4 08/04/2022 10:45 AM    BLOODU Negative 08/04/2022 10:45 AM    SPECGRAV 1.064 08/04/2022 10:45 AM    GLUCOSEU Negative 08/04/2022 10:45 AM       Radiology:  XR CHEST PORTABLE   Final Result   Tubes as above. Worsening bilateral interstitial opacities. XR CHEST PORTABLE   Final Result   Tip of endotracheal tube is high in position, estimated 7 cm proximal to the   farheen. Suggest repositioning. Airspace disease within the mid to lower lung zones bilaterally, right   greater than left. Small right pleural effusion. Findings were called by the radiology call center.                  Assessment/Plan:    Active Hospital Problems    Diagnosis     Septic shock ([facility] [A41.9, R65.21]      Priority: Medium    Acute respiratory failure with hypoxia and hypercapnia (HCC) [J96.01, J96.02]      Priority: Medium    Pneumonia of right lower lobe due to infectious organism [J18.9]      Priority: Medium    Metabolic encephalopathy [L89.14]      Priority: Medium    [facility]STEM[facility] (non[facility] elevated [facility]) [facility] [I21.4]      Priority: Medium ACute resp failure with hypoxia due to PNA: possibly aspiration PNA: cont vent support, pulmo managing vent. Cont abx  Follow up on cx from bronchoscopy and blood cx  Aspiration precautions   Check CT head to r/o any central etiology of AMS  Sepsis/septic shock , POA : resolved, off pressors  Sepsis due to Right sided PNA, POA with associated end organ failure as shown by acute resp failure with hypoxia   Nstemi: likely a combination of type one nad type 2 given new ECHO findings. Cardiology ff. Off heparin drip. ECHO from 2019 in care everywhere with new wall motion abnormality in apex and distal inferior walls, reduced EF 35-40% compared to 55-60%  Anemia : unmasked with IVF hydration: iron deficient. IV iron and folic acid. Consider GI input if needed.   Supportive care  Q2h turns   Aspiration p[precautions       DVT Prophylaxis: SCDs  Diet: Diet NPO Exceptions are: Ice Chips, Sips of Water with Meds  ADULT TUBE FEEDING; Orogastric; Peptide Based; Continuous; 20; Yes; 10; Q 4 hours; 65; 30; Q 4 hours  Code Status: Full Code        Dispo - possible dc back to group home [facility] in 3-4 days pending progress     Darryl Avalos MD

## 2022-08-07 NOTE — PROGRESS NOTES
Pulmonary Progress Note    CC:  Follow up respiratory failure, pneumonia, shock    Subjective:  FIO2 at 30%, PEEP at 5  PS mode  Febrile with afib with RVR yesterday  CT Brain without acute insult   Thicker secretions today  Stronger cough      Intake/Output Summary (Last 24 hours) at 8/7/2022 0706  Last data filed at 8/7/2022 0606  Gross per 24 hour   Intake 1814.07 ml   Output 1545 ml   Net 269.07 ml           PHYSICAL EXAM:  Blood pressure 126/87, pulse (!) 118, temperature (!) 100.9 °F (38.3 °C), temperature source Axillary, resp. rate 30, height 5' 9\" (1.753 m), weight 139 lb 15.9 oz (63.5 kg), SpO2 99 %.'  Gen: Wakes up and follows commands   Eyes: PERRL. No sclera icterus. No conjunctival injection. ENT: No discharge. Pharynx with ETT and gastric tube  Neck: Trachea midline. No obvious mass. Resp: Right lung rhonchi   CV: Tachy, irregular. No murmur or rub. GI: Non-tender. Non-distended. No hernia. Skin: Warm, dry, normal texture and turgor. No nodule on exposed extremities. Lymph: No cervical LAD. No supraclavicular LAD. M/S: No cyanosis. No clubbing. No joint deformity. Neuro:  Follows commands   Ext:   no edema    Medications:    Scheduled Meds:   enoxaparin  40 mg SubCUTAneous Daily    iron sucrose  200 mg IntraVENous Daily    folic acid  1 mg Oral Daily    phosphorus  250 mg Oral BID    metoprolol tartrate  12.5 mg Oral BID    aspirin  81 mg Oral Daily    sodium chloride flush  10 mL IntraVENous 2 times per day    cefepime  2,000 mg IntraVENous Q12H    metroNIDAZOLE  500 mg IntraVENous Q8H    levETIRAcetam  500 mg IntraVENous Q12H    pantoprazole (PROTONIX) 40 mg injection  40 mg IntraVENous Q12H    atorvastatin  40 mg Oral Daily    clopidogrel  75 mg Oral Daily    donepezil  10 mg Oral Nightly    QUEtiapine  50 mg Oral Nightly    tamsulosin  0.8 mg Oral Daily    memantine  10 mg Oral BID    chlorhexidine  15 mL Mouth/Throat BID    lactobacillus  1 capsule Oral BID        Continuous Infusions:   norepinephrine Stopped (22)    sodium chloride         PRN Meds:  sodium phosphate IVPB **OR** sodium phosphate IVPB, fentanNYL, sodium chloride flush, sodium chloride, potassium chloride, magnesium sulfate, promethazine **OR** ondansetron, acetaminophen **OR** acetaminophen, perflutren lipid microspheres    Labs:  CBC:   Recent Labs     22  0400 22  0416 22  0415   WBC 9.0 10.2 11.6*   HGB 7.5* 7.8* 7.9*   HCT 22.6* 23.0* 23.6*   MCV 91.3 91.4 91.4   * 128* 146       BMP:   Recent Labs     22  0400 22  0416 22  1639 22  0415    139  --  139   K 4.0 3.6  --  3.5    108  --  106   CO2 21 22  --  22   PHOS 2.5 1.5* 2.9 2.1*   BUN 37* 34*  --  39*   CREATININE 0.9 0.8  --  0.8       LIVER PROFILE:   Recent Labs     22  1335 22  0400 22  0416   AST 80* 63* 44*   ALT 44* 39 38   BILIDIR <0.2  --   --    BILITOT 0.6 0.4 0.4   ALKPHOS 41 38* 45       PT/INR: No results for input(s): PROTIME, INR in the last 72 hours. APTT:   Recent Labs     22  0400 22  1106   APTT 60.0* 82.6* 78.2*       UA:  Recent Labs     22  1045   COLORU Yellow   PHUR 5.5   WBCUA 2   RBCUA 4   BACTERIA None Seen   CLARITYU Clear   SPECGRAV 1.064   LEUKOCYTESUR Negative   UROBILINOGEN 0.2   BILIRUBINUR Negative   BLOODU Negative   GLUCOSEU Negative       No results for input(s): PH, PCO2, PO2 in the last 72 hours.         Films:  Chest imaging reports were reviewed and imaging was reviewed by me and showed ETT, gastric tube with continued RLL airspace disease     AB.39/38/64    Cultures:  MRSA probe:  negative  BAL:  NGTD  Blood:  NGTD    I reviewed the labs and images listed above    Assessment/Plan:   Acute Hypoxic/Hypercapnic Respiratory Failure with saturations less than 90% on room air and pH less than 7.35, due to pneumonia  ABG now to assess for extubation as he has been on PS mode for 48 hours   PS mode as tolerated   Septic Shock   Off levophed  Continue with Cefepime and Flagyl  RLL Pneumonia,, risk for gram negative due to aspiration   Bronchoscopy on 8/4  Cefepime and flagyl to complete course. 7 days   Procal trending down    Lactobacillus  Cultures negative to date   Metabolic Encephalopathy with underlying dementia   NSTEMI (peak troponin was 5)  No intervention required per Cardiology  Cardiomyopathy with EF 35-40%  Lasix 40 mg IV once   7. A fib with RVR   Scheduled beta blocker   Lasix 40 mg IV once    Not on any beta agonists    Enteral feeding on hold for possible extubation      Add potassium protocol as his K is likely to decrease with lasix today  Add colace    GI prophylaxis  Protonix  DVT prophylaxis  Lovenox       Critical care time of 31 minutes. This does not include procedural time. Please see procedural notes for details.     Parth Dominguez, DO  [facility]

## 2022-08-07 NOTE — PROGRESS NOTES
Pt Afin on monitor alarming increase ST elevation. EKG completed. EKG does not show ST elevation. Reviewed in person EKG with MD Quique Gage.  No new orders

## 2022-08-07 NOTE — PROGRESS NOTES
1915: Report received from [facility]; bedside handoff complete. Pt is awake and restless in bed; tachypneic on vent. Repositioned in bed; calms minimally with intervention. Will medicate for comfort. All lines/tubes in place. Restraints in place to BUE's to protect airway/lines  2000: Assessment complete--as charted. Message sent to Dr. Amaya [facility] re possible low dose propofol overnight for comfort on full support and order for PICC. Tylenol given for temp 101.1.   2005: New orders received. To maintain CVC; will initiate low dose fentanyl gtt  2315: Continues with frequent episodes of tachypnea/restlessness. Titrating fentanyl for comfort/ventilatory compliance. 0030: Bath complete. Remains periodically restless but vital signs remain stable.  Now in CAF  0400: BP trending down' will wean fentanyl( smaller increments required d/t medication sensitivity) to maintain vent compliance/comfort and improve BP.  0745: Report given to [facility]; bedside handoff and skin assessment complete

## 2022-08-07 NOTE — PROGRESS NOTES
Perfect serve message sent to MD Marsha Blount at 1013. I did review order to extubate patient. Pt is restless in bed, Afib on monitor, RR 28-35/min but will have frequent tachypnea episodes with RR 50-60s without any stimulation. Pt appears labored at rest with increase RR and with any stimulation. Per MD Marsha Blount cancel extubation order and switch back to vent support and give PRN Fentanyl.

## 2022-08-07 NOTE — PLAN OF CARE
Problem: Discharge Planning  Goal: Discharge to home or other facility with appropriate resources  Outcome: Progressing     Problem: Safety - Medical Restraint  Goal: Remains free of injury from restraints (Restraint for Interference with Medical Device)  Description: INTERVENTIONS:  1. Determine that other, less restrictive measures have been tried or would not be effective before applying the restraint  2. Evaluate the patient's condition at the time of restraint application  3. Inform patient/family regarding the reason for restraint  4. Q2H: Monitor safety, psychosocial status, comfort, nutrition and hydration  Outcome: Progressing     Problem: Nutrition Deficit:  Goal: Optimize nutritional status  Outcome: Progressing     Problem: Pain  Goal: Verbalizes/displays adequate comfort level or baseline comfort level  Outcome: Progressing  Flowsheets (Taken 8/6/2022 2000)  Verbalizes/displays adequate comfort level or baseline comfort level: Assess pain using appropriate pain scale     Problem: Skin/Tissue Integrity  Goal: Absence of new skin breakdown  Description: 1. Monitor for areas of redness and/or skin breakdown  2. Assess vascular access sites hourly  3. Every 4-6 hours minimum:  Change oxygen saturation probe site  4. Every 4-6 hours:  If on nasal continuous positive airway pressure, respiratory therapy assess nares and determine need for appliance change or resting period.   Outcome: Progressing     Problem: Safety - Adult  Goal: Free from fall injury  Outcome: Progressing  Flowsheets (Taken 8/6/2022 2000)  Free From Fall Injury: Instruct family/caregiver on patient safety     Problem: ABCDS Injury Assessment  Goal: Absence of physical injury  Outcome: Progressing  Flowsheets (Taken 8/6/2022 2000)  Absence of Physical Injury: Implement safety measures based on patient assessment

## 2022-08-07 NOTE — PROGRESS NOTES
Hospitalist Progress Note      PCP: Jen Palm MD    Date of Admission: 8/4/2022        Hospital Course: pt  admitted with hypoxia , elevated troponins and AMS, has Right lower and middle lobe PNA with acute resp failure with hypoxia. Also has elevated troponins. work up and management ongoing. Subjective: pt seen and examined. Plans for extubation today cancelled on account of agitation and tachypnea. Discussed with bedside RN.        Medications:  Reviewed    Infusion Medications    sodium chloride       Scheduled Medications    docusate  100 mg Oral Daily    cefepime  2,000 mg IntraVENous Q12H    enoxaparin  40 mg SubCUTAneous Daily    iron sucrose  200 mg IntraVENous Daily    folic acid  1 mg Oral Daily    phosphorus  250 mg Oral BID    metoprolol tartrate  12.5 mg Oral BID    aspirin  81 mg Oral Daily    sodium chloride flush  10 mL IntraVENous 2 times per day    metroNIDAZOLE  500 mg IntraVENous Q8H    levETIRAcetam  500 mg IntraVENous Q12H    pantoprazole (PROTONIX) 40 mg injection  40 mg IntraVENous Q12H    atorvastatin  40 mg Oral Daily    clopidogrel  75 mg Oral Daily    donepezil  10 mg Oral Nightly    QUEtiapine  50 mg Oral Nightly    tamsulosin  0.8 mg Oral Daily    memantine  10 mg Oral BID    lactobacillus  1 capsule Oral BID      PRN Meds: sodium phosphate IVPB **OR** sodium phosphate IVPB, potassium chloride, fentanNYL, sodium chloride flush, sodium chloride, magnesium sulfate, promethazine **OR** ondansetron, acetaminophen **OR** acetaminophen, perflutren lipid microspheres      Intake/Output Summary (Last 24 hours) at 8/7/2022 1219  Last data filed at 8/7/2022 0820  Gross per 24 hour   Intake 1734.07 ml   Output 995 ml   Net 739.07 ml         Physical Exam Performed:    BP (!) 140/80   Pulse (!) 115   Temp 99.8 °F (37.7 °C) (Oral)   Resp 19   Ht 5' 9\" (1.753 m)   Wt 139 lb 15.9 oz (63.5 kg)   SpO2 97%   BMI 20.67 kg/m²     Intubated on full vent support  Wakes up and follows commands   Lungs CTA b/l  Abdomen soft and non tender   CVS s1 and s2 rrr  No edema    Labs:   Recent Labs     08/05/22  0400 08/06/22  0416 08/07/22  0415   WBC 9.0 10.2 11.6*   HGB 7.5* 7.8* 7.9*   HCT 22.6* 23.0* 23.6*   * 128* 146       Recent Labs     08/05/22  0400 08/06/22  0416 08/06/22  1639 08/07/22  0415    139  --  139   K 4.0 3.6  --  3.5    108  --  106   CO2 21 22  --  22   BUN 37* 34*  --  39*   CREATININE 0.9 0.8  --  0.8   CALCIUM 8.0* 7.8*  --  7.8*   PHOS 2.5 1.5* 2.9 2.1*       Recent Labs     08/04/22  1335 08/05/22  0400 08/06/22  0416   AST 80* 63* 44*   ALT 44* 39 38   BILIDIR <0.2  --   --    BILITOT 0.6 0.4 0.4   ALKPHOS 41 38* 45       No results for input(s): INR in the last 72 hours. No results for input(s): [place] in the last 72 hours. Urinalysis:      Lab Results   Component Value Date/Time    NITRU Negative 08/04/2022 10:45 AM    WBCUA 2 08/04/2022 10:45 AM    BACTERIA None Seen 08/04/2022 10:45 AM    RBCUA 4 08/04/2022 10:45 AM    BLOODU Negative 08/04/2022 10:45 AM    SPECGRAV 1.064 08/04/2022 10:45 AM    GLUCOSEU Negative 08/04/2022 10:45 AM       Radiology:  XR CHEST PORTABLE   Final Result   Chest: Nasogastric tube extends to the abdominal left upper quadrant. No significant interval change in bibasilar airspace disease or lateral right   apical pleuroparenchymal thickening as compared to prior. Head: Atrophy and small vessel ischemic disease. CT HEAD WO CONTRAST   Final Result   Chest: Nasogastric tube extends to the abdominal left upper quadrant. No significant interval change in bibasilar airspace disease or lateral right   apical pleuroparenchymal thickening as compared to prior. Head: Atrophy and small vessel ischemic disease. XR CHEST PORTABLE   Final Result   Tubes as above. Worsening bilateral interstitial opacities.          XR CHEST PORTABLE   Final Result   Tip of endotracheal tube is high in position, estimated 7 cm proximal to the   farheen. Suggest repositioning. Airspace disease within the mid to lower lung zones bilaterally, right   greater than left. Small right pleural effusion. Findings were called by the radiology call center. Assessment/Plan:    Active Hospital Problems    Diagnosis     Septic shock ([facility] [address] [A41.9, R65.21]      Priority: Medium    Acute respiratory failure with hypoxia and hypercapnia (HCC) [J96.01, J96.02]      Priority: Medium    Pneumonia of right lower lobe due to infectious organism [J18.9]      Priority: Medium    Metabolic encephalopathy [A01.55]      Priority: Medium    NSTEMI (non-ST elevated myocardial infarction) ([facility] [address] [I21.4]      Priority: Medium     ACute resp failure with hypoxia : s/p intubation  Vent management by pulmo team  Right sided PNA, suspect aspiration : underwent bronchoscopy on 8/4/2022 by pulmo. Follow up cx. Cont abx . Aggressive pulmo toilet   Episode of Afib RVR yesterday , resolved , started home dose of BB. Sepsis/septic shock , POA : resolved, off pressors  Sepsis due to Right sided PNA, POA with associated end organ failure as shown by acute resp failure with hypoxia   Nstemi: cont cardiac meds, follow up with cardiology recomm  Anemia : unmasked with IVF hydration: iron deficient. IV iron and folic acid. Consider GI input if needed.   Supportive care  Q2h turns   Aspiration p[precautions       DVT Prophylaxis: SCDs  Diet: No diet orders on file  Code Status: Full Code        Dispo - possible dc back to group home vrs vrs ECF in 3-4 days pending progress     Hardik Romero MD

## 2022-08-08 NOTE — CARE COORDINATION
Discharge Planning:  Palliative care is following and is in communication with pts group home staff. Group home staff is to have a care conference today and will follow up with Frances Martines with palliative care on 8/09. SW will continue to peripherally follow to assist with d/c planning as needed.    Abdifatah , KANA PISANO  [phone]  Electronically signed by Aleah Guthrie on 8/8/2022 at 3:37 PM

## 2022-08-08 NOTE — PROGRESS NOTES
Comprehensive Nutrition Assessment    Type and Reason for Visit:  Reassess    Nutrition Recommendations/Plan:   Continue NPO  Continue EN regimen at goal rate: Vital 1.2 AF @ 65ml/hr  Water flush per provider  Continue EN following extubation until PO diet can begin     Malnutrition Assessment:  Malnutrition Status:  Insufficient data (08/04/22 1132)    Context:  Chronic Illness       Nutrition Assessment:    Follow-up. Pt remains intubated and sedated. Sedated with fentanyl only. EN running at goal rate. Plan for SBT today with bronch prior to extubation. RD to recommend EN until PO diet can begin. Nutrition Related Findings:    +1.4 liters. Glucose 148. Phos 1.5. +BM 8/8. Wound Type: Skin Tears       Current Nutrition Intake & Therapies:    Average Meal Intake: NPO  Average Supplements Intake: NPO  ADULT TUBE FEEDING; Orogastric; Peptide Based; Continuous; 20; Yes; 10; Q 4 hours; 65; 30; Q 4 hours  Current Tube Feeding (TF) Orders:  Feeding Route: Nasogastric  Formula: Peptide Based  Schedule: Continuous  Feeding Regimen: Vital 1.2 AF @ 65ml/hr  Water Flushes: per provider  Current TF & Flush Orders Provides: See goal.  Goal TF & Flush Orders Provides: Vital 1.2 AF at goal rate 65ml/hr. This EN regimen provides 1300ml TV, 1560 kcals, 98 grams protein, and 1054ml free water. (Calculated x 20 hours to account for routine nursing care)    Anthropometric Measures:  Height: 5' 9\" (175.3 cm)  Ideal Body Weight (IBW): 160 lbs (73 kg)    Admission Body Weight: 137 lb (62.1 kg)  Current Body Weight: 138 lb (62.6 kg), 86.3 % IBW. Weight Source: Bed Scale  Current BMI (kg/m2): 20.4  Weight Adjustment For: No Adjustment  BMI Categories: Normal Weight (BMI 18.5-24. 9)    Estimated Daily Nutrient Needs:  Energy Requirements Based On: Kcal/kg  Weight Used for Energy Requirements: Current  Energy (kcal/day): 6984-8023 (25-30kcal/62kg)  Weight Used for Protein Requirements: Current  Protein (g/day):  (1.2-2.0g/62kg)  Method Used for Fluid Requirements: Other (Comment)  Fluid (ml/day): per provider    Nutrition Diagnosis:   Inadequate oral intake related to impaired respiratory function as evidenced by intubation, NPO or clear liquid status due to medical condition    Nutrition Interventions:   Food and/or Nutrient Delivery: Continue NPO, Continue Current Tube Feeding  Nutrition Education/Counseling: Education not indicated  Coordination of Nutrition Care: Continue to monitor while inpatient    Goals:  Previous Goal Met: Progressing toward Goal(s)  Goals: Tolerate nutrition support at goal rate  Specify Other Goals: Initiate most appropriate form of nutrition by next RD assessment    Nutrition Monitoring and Evaluation:   Behavioral-Environmental Outcomes: None Identified  Food/Nutrient Intake Outcomes: Diet Advancement/Tolerance, Food and Nutrient Intake, Enteral Nutrition Intake/Tolerance, IVF Intake  Physical Signs/Symptoms Outcomes: Biochemical Data, Chewing or Swallowing, Fluid Status or Edema, Hemodynamic Status, Skin, Weight, Nutrition Focused Physical Findings    Discharge Planning:     Too soon to determine     Isaías Llanes, [address] [place] [address],   Contact: [phone]

## 2022-08-08 NOTE — CARE COORDINATION
[facility]  Palliative Care   Progress Note    NAME:  Dariel Ga RECORD NUMBER:  [id number]  AGE: [de-identified] y.o. GENDER: male  : 1940  TODAY'S DATE:  2022    Subjective: remains on vent, following commands. Objective:    Vitals:    22 1300   BP: (!) 107/58   Pulse: 82   Resp: 12   Temp:    SpO2: 98%     Lab Results   Component Value Date    WBC 9.4 2022    HGB 7.2 (L) 2022    HCT 21.2 (L) 2022    MCV 91.7 2022     2022     Lab Results   Component Value Date    CREATININE 0.8 2022    BUN 37 (H) 2022     2022    K 4.0 2022     2022    CO2 25 2022     Lab Results   Component Value Date    ALT 38 2022    AST 44 (H) 2022    ALKPHOS 45 2022    BILITOT 0.4 2022       Plan: I have reached out to Александр Osorio [phone] to discuss his current situation. [facility] having a care conference today, she will call me with any decision they make. He is slow to come off vent, has weak cough and mental status poor. Ms Clarissa Massey states he has always had a poor cough and she had noticed a weight loss. Ms Clarissa Massey returned call stated [facility] has started working on obtaining guardianship. Code Status: Full Code  Discharge Environment:  [] Hospice Consult Agency:  [] Inpatient Hospice    [] Home with  [facility]   [] ECF with Hospice  [] ECF skilled care with Hospice to follow   [] Other:    Teaching Time:  1hours      I will continue to follow Mr. Morales's care as needed. Thank you for allowing me to participate in the care of Mr. Jennifer Hartman .      Electronically signed by Eddie Morel RN, BSN,CHPN on 2022 at 1:19 PM  [address] [place] [address]  Office: [phone]

## 2022-08-08 NOTE — PROGRESS NOTES
Dynamic infusion previously called to request PICC line placement. This writer requested for PICC RN to call to discuss consent obtained from MD providers as there is no legal guardian or family listed to contact and informed call center of Atrial Fibrillation. Still awaiting PICC RN call.   Electronically signed by Kaiser Young RN on 8/8/2022 at 6:46 PM

## 2022-08-08 NOTE — PROGRESS NOTES
Hospitalist Progress Note      PCP: Tristian Maldonado MD    Date of Admission: 8/4/2022    Subjective: cont to be intubated, on SBT, still with lots of secretions    Medications:  Reviewed    Infusion Medications    sodium chloride      fentaNYL 37.5 mcg/hr (08/08/22 0600)     Scheduled Medications    lidocaine 1 % injection  5 mL IntraDERmal Once    sodium chloride flush  5-40 mL IntraVENous 2 times per day    docusate  100 mg Oral Daily    cefepime  2,000 mg IntraVENous Q12H    chlorhexidine  15 mL Mouth/Throat BID    enoxaparin  40 mg SubCUTAneous Daily    iron sucrose  200 mg IntraVENous Daily    folic acid  1 mg Oral Daily    phosphorus  250 mg Oral BID    metoprolol tartrate  12.5 mg Oral BID    aspirin  81 mg Oral Daily    metroNIDAZOLE  500 mg IntraVENous Q8H    levETIRAcetam  500 mg IntraVENous Q12H    pantoprazole (PROTONIX) 40 mg injection  40 mg IntraVENous Q12H    atorvastatin  40 mg Oral Daily    clopidogrel  75 mg Oral Daily    donepezil  10 mg Oral Nightly    QUEtiapine  50 mg Oral Nightly    tamsulosin  0.8 mg Oral Daily    memantine  10 mg Oral BID    lactobacillus  1 capsule Oral BID      PRN Meds: sodium chloride flush, sodium chloride, sodium phosphate IVPB **OR** sodium phosphate IVPB, potassium chloride, fentaNYL **AND** fentaNYL, fentanNYL, magnesium sulfate, promethazine **OR** ondansetron, acetaminophen **OR** acetaminophen, perflutren lipid microspheres      Intake/Output Summary (Last 24 hours) at 8/8/2022 1700  Last data filed at 8/8/2022 1525  Gross per 24 hour   Intake 1802.09 ml   Output 815 ml   Net 987.09 ml       Physical Exam Performed:    /79   Pulse (!) 110   Temp 97.9 °F (36.6 °C) (Axillary)   Resp 18   Ht 5' 9\" (1.753 m)   Wt 138 lb 7.2 oz (62.8 kg)   SpO2 91%   BMI 20.45 kg/m²       Intubated on full vent support  Wakes up and follows commands  Lungs CTA b/l  Abdomen soft and non tender  CVS s1 and s2 rrr  No edema    Labs:   Recent Labs     08/06/22  4455 08/07/22  0415 08/08/22  0415   WBC 10.2 11.6* 9.4   HGB 7.8* 7.9* 7.2*   HCT 23.0* 23.6* 21.2*   * 146 142     Recent Labs     08/06/22  0416 08/06/22  1639 08/07/22  0415 08/07/22  1255 08/07/22  1800 08/08/22  0415 08/08/22  0955 08/08/22  1355     --  139  --   --  140  --   --    K 3.6   < > 3.5 3.8  --  3.1* 4.0  --      --  106  --   --  108  --   --    CO2 22  --  22  --   --  25  --   --    BUN 34*  --  39*  --   --  37*  --   --    CREATININE 0.8  --  0.8  --   --  0.8  --   --    CALCIUM 7.8*  --  7.8*  --   --  7.2*  --   --    PHOS 1.5*   < > 2.1*  --  2.3* 1.5*  --  2.9    < > = values in this interval not displayed. Recent Labs     08/06/22 0416   AST 44*   ALT 38   BILITOT 0.4   ALKPHOS 45     No results for input(s): INR in the last 72 hours. No results for input(s): Bettye [place] in the last 72 hours. Urinalysis:      Lab Results   Component Value Date/Time    NITRU Negative 08/04/2022 10:45 AM    WBCUA 2 08/04/2022 10:45 AM    BACTERIA None Seen 08/04/2022 10:45 AM    RBCUA 4 08/04/2022 10:45 AM    BLOODU Negative 08/04/2022 10:45 AM    SPECGRAV 1.064 08/04/2022 10:45 AM    GLUCOSEU Negative 08/04/2022 10:45 AM       Radiology:  XR CHEST PORTABLE   Final Result   Chest: Nasogastric tube extends to the abdominal left upper quadrant. No significant interval change in bibasilar airspace disease or lateral right   apical pleuroparenchymal thickening as compared to prior. Head: Atrophy and small vessel ischemic disease. CT HEAD WO CONTRAST   Final Result   Chest: Nasogastric tube extends to the abdominal left upper quadrant. No significant interval change in bibasilar airspace disease or lateral right   apical pleuroparenchymal thickening as compared to prior. Head: Atrophy and small vessel ischemic disease. XR CHEST PORTABLE   Final Result   Tubes as above. Worsening bilateral interstitial opacities.          XR CHEST PORTABLE Final Result   Tip of endotracheal tube is high in position, estimated 7 cm proximal to the   farheen. Suggest repositioning. Airspace disease within the mid to lower lung zones bilaterally, right   greater than left. Small right pleural effusion. Findings were called by the radiology call center. Assessment/Plan:    Active Hospital Problems    Diagnosis     Septic shock ([facility] [address] [A41.9, R65.21]      Priority: Medium    Acute respiratory failure with hypoxia (HCC) [J96.01]      Priority: Medium    Pneumonia of right lower lobe due to infectious organism [J18.9]      Priority: Medium    Metabolic encephalopathy [F57.96]      Priority: Medium    NSTEMI (non-ST elevated myocardial infarction) ([facility] [address] [I21.4]      Priority: Medium           Acute resp failure with hypoxia : s/p intubation  Vent management by critical care team    Right sided PNA, suspect aspiration : underwent bronchoscopy on 8/4/2022 by pulmo. Follow up cx. Cont abx . Aggressive pulmo toilet    Episode of Afib RVR yesterday , resolved , started home dose of BB. Sepsis/septic shock , POA : resolved, off pressors  Sepsis due to Right sided PNA, POA with associated end organ failure as shown by acute resp failure with hypoxia    Nstemi: cont cardiac meds, follow up with cardiology recomm    Anemia : unmasked with IVF hydration: iron deficient. IV iron and folic acid. Consider GI input if needed.     Supportive care  Q2h turns  Aspiration precautions        DVT Prophylaxis: SCDs  Diet: ADULT TUBE FEEDING; Orogastric; Peptide Based; Continuous; 20; Yes; 10; Q 4 hours; 65; 100; Q 4 hours  Code Status: Full Code    PT/OT Eval Status: not indicated    [facility] leandra Valdez MD

## 2022-08-08 NOTE — PROGRESS NOTES
Pulmonary Critical Care Progress Note     Patient's name:  Monique Lopez  Medical Record Number: [id number]  Patient's account/billing number: [de-identified]  Patient's YOB: 1940  Age: [de-identified] y.o. Date of Admission: 8/4/2022  4:16 AM  Date of Consult: 8/8/2022      Primary Care Physician: Sudha Joseph MD      Code Status: Full Code    Chief complaint: Acute respiratory failure with hypoxia    Assessment and Plan     Acute respiratory with hypoxia status post intubation mechanical ventilation. Aspiration pneumonia right lower lobe  Metabolic encephalopathy with underlying dementia. Chronic CHF with EF of 35%. Chronic atrial fibrillation. Non-ST elevation myocardial infarction. Septic shock resolved        Plan:  Still with poor cough and fluctuating mental status precludes extubation, wean off fentanyl as tolerated, use Precedex if needed for agitation, started on SBT continue as tolerated, plan to [facility] before extubation when he is ready. Antibiotics cefepime and Flagyl for 7 days. Bronchodilators scheduled. GI and DVT prophylaxis. Overnight:  Agitated overnight on fentanyl infusion. On the vent. Copious tracheal secretions and poor coughing. Mental status fluctuates    REVIEW OF SYSTEMS:  Unable to obtain intubated on mechanical ventilation. Physical Exam:    Vitals: /70   Pulse 93   Temp 97.8 °F (36.6 °C) (Axillary)   Resp 12   Ht 5' 9\" (1.753 m)   Wt 138 lb 7.2 oz (62.8 kg)   SpO2 95%   BMI 20.45 kg/m²     Last Body weight:   Wt Readings from Last 3 Encounters:   08/08/22 138 lb 7.2 oz (62.8 kg)   07/12/16 161 lb (73 kg)   11/02/11 180 lb (81.6 kg)       Body Mass Index : Body mass index is 20.45 kg/m².       Intake and Output summary:   Intake/Output Summary (Last 24 hours) at 8/8/2022 1129  Last data filed at 8/8/2022 0800  Gross per 24 hour   Intake 2639.79 ml   Output 2965 ml   Net -325.21 ml       Physical Examination:     Gen: Sedated on mechanical ventilation  Eyes: PERRL. Anicteric sclera. No conjunctival injection. ENT: No discharge. Posterior oropharynx clear. External appearance of ears and nose normal.  Neck: Trachea midline. No mass   Resp: Bilateral scattered rhonchi no active wheezing  CV: Regular rate. Regular rhythm. No murmur or rub. No edema. GI: Soft, Non-tender. Non-distended. +BS  Skin: Warm, dry, w/o erythema. Lymph: No cervical or supraclavicular LAD. M/S: No cyanosis. No clubbing. Neuro: Sedated on mechanical ventilation, no focal deficit. Laboratory findings:-    CBC:   Recent Labs     08/08/22  0415   WBC 9.4   HGB 7.2*        BMP:    Recent Labs     08/06/22  0416 08/07/22  0415 08/07/22  1255 08/08/22  0415 08/08/22  0955    139  --  140  --    K 3.6 3.5   < > 3.1* 4.0    106  --  108  --    CO2 22 22  --  25  --    BUN 34* 39*  --  37*  --    CREATININE 0.8 0.8  --  0.8  --    GLUCOSE 85 114*  --  156*  --     < > = values in this interval not displayed. S. Calcium:  Recent Labs     08/08/22  0415   CALCIUM 7.2*       S. Magnesium:  Recent Labs     08/08/22  0415   MG 1.80     S. Phosphorus:  Recent Labs     08/08/22  0415   PHOS 1.5*     S. Glucose:  Recent Labs     08/07/22  2341 08/08/22  0359 08/08/22  0800   POCGLU 125* 170* 148*           Radiology Review:  Pertinent images / reports were reviewed as a part of this visit.     Reviewed  CC time 35 minutes                     Lyle Shukla MD, MMOOSE.            8/8/2022, 11:29 AM

## 2022-08-08 NOTE — PLAN OF CARE
Problem: Safety - Medical Restraint  Goal: Remains free of injury from restraints (Restraint for Interference with Medical Device)  Description: INTERVENTIONS:  1. Determine that other, less restrictive measures have been tried or would not be effective before applying the restraint  2. Evaluate the patient's condition at the time of restraint application  3. Inform patient/family regarding the reason for restraint  4. Q2H: Monitor safety, psychosocial status, comfort, nutrition and hydration  Outcome: Progressing  Flowsheets  Taken 8/8/2022 1000  Remains free of injury from restraints (restraint for interference with medical device):   Determine that other, less restrictive measures have been tried or would not be effective before applying the restraint   Evaluate the patient's condition at the time of restraint application   Every 2 hours: Monitor safety, psychosocial status, comfort, nutrition and hydration  Taken 8/8/2022 0800  Remains free of injury from restraints (restraint for interference with medical device):   Determine that other, less restrictive measures have been tried or would not be effective before applying the restraint   Evaluate the patient's condition at the time of restraint application   Every 2 hours: Monitor safety, psychosocial status, comfort, nutrition and hydration   Continues with bilateral soft wrist restraints, see flow sheets. Problem: Nutrition Deficit:  Goal: Optimize nutritional status  Outcome: Progressing   Continues with NG TF at goal rate, tolerating to present time. Problem: Pain  Goal: Verbalizes/displays adequate comfort level or baseline comfort level  Outcome: Progressing   Remains intubated and lightly sedated with Fentanyl drip. Monitoring for pain using CPOT scale. Problem: Skin/Tissue Integrity  Goal: Absence of new skin breakdown  Description: 1. Monitor for areas of redness and/or skin breakdown  2. Assess vascular access sites hourly  3.   Every 4-6 hours minimum:  Change oxygen saturation probe site  4. Every 4-6 hours:  If on nasal continuous positive airway pressure, respiratory therapy assess nares and determine need for appliance change or resting period. Outcome: Progressing   Monitoring patient skin integrity for skin breakdown, turning and repositioning with pillow support q2h per protocol. Patient dependent on staff for turning and repositioning self. Problem: Safety - Adult  Goal: Free from fall injury  Outcome: Progressing  Flowsheets (Taken 8/8/2022 1050)  Free From Fall Injury: Instruct family/caregiver on patient safety   Falling star program remains in place. Call light and personal belongings within reach. Frequent visual monitoring continues. Kim catheter in place. Patient dependent on staff for turning/repositioning at least once every 2 hours, and on a prn basis.     Electronically signed by José Miguel Julien RN on 8/8/2022 at 11:14 AM

## 2022-08-09 PROBLEM — T17.800A MULTIPLE TRACHEOBRONCHIAL MUCUS PLUGS: Status: ACTIVE | Noted: 2022-01-01

## 2022-08-09 NOTE — PROGRESS NOTES
ventilation  Eyes: PERRL. Anicteric sclera. No conjunctival injection. ENT: No discharge. Posterior oropharynx clear. External appearance of ears and nose normal.  Neck: Trachea midline. No mass   Resp: Bilateral scattered rhonchi no active wheezing  CV: Regular rate. Regular rhythm. No murmur or rub. No edema. GI: Soft, Non-tender. Non-distended. +BS  Skin: Warm, dry, w/o erythema. Lymph: No cervical or supraclavicular LAD. M/S: No cyanosis. No clubbing. Neuro: Sedated on mechanical ventilation, no focal deficit. Laboratory findings:-    CBC:   Recent Labs     08/09/22  0356   WBC 9.1   HGB 7.2*          BMP:    Recent Labs     08/07/22  0415 08/07/22  1255 08/08/22  0415 08/08/22  0955 08/09/22  0356     --  140  --  144   K 3.5   < > 3.1*   < > 4.0     --  108  --  111*   CO2 22  --  25  --  24   BUN 39*  --  37*  --  44*   CREATININE 0.8  --  0.8  --  0.9   GLUCOSE 114*  --  156*  --  170*    < > = values in this interval not displayed. S. Calcium:  Recent Labs     08/09/22  0356   CALCIUM 7.4*         S. Magnesium:  Recent Labs     08/09/22  0356   MG 2.00       S. Phosphorus:  Recent Labs     08/09/22  0356   PHOS 2.2*       S. Glucose:  Recent Labs     08/08/22  0800 08/08/22  1552 08/09/22  0801   POCGLU 148* 145* 132*             Radiology Review:  Pertinent images / reports were reviewed as a part of this visit.     Reviewed  CC time 35 minutes                     Bria Rushing MD, M.D.            8/9/2022, 10:42 AM

## 2022-08-09 NOTE — PLAN OF CARE
Problem: Safety - Medical Restraint  Goal: Remains free of injury from restraints (Restraint for Interference with Medical Device)  Description: INTERVENTIONS:  1. Determine that other, less restrictive measures have been tried or would not be effective before applying the restraint  2. Evaluate the patient's condition at the time of restraint application  3. Inform patient/family regarding the reason for restraint  4. Q2H: Monitor safety, psychosocial status, comfort, nutrition and hydration  8/9/2022 1446 by Leigh Rodriguez RN  Outcome: Progressing  Flowsheets (Taken 8/9/2022 0800)  Remains free of injury from restraints (restraint for interference with medical device): Every 2 hours: Monitor safety, psychosocial status, comfort, nutrition and hydration     Problem: Nutrition Deficit:  Goal: Optimize nutritional status  8/9/2022 1446 by Leigh Rodriguez RN  Outcome: Progressing     Problem: Pain  Goal: Verbalizes/displays adequate comfort level or baseline comfort level  8/9/2022 1446 by Leigh Rodriguez RN  Outcome: Progressing     Problem: Skin/Tissue Integrity  Goal: Absence of new skin breakdown  Description: 1. Monitor for areas of redness and/or skin breakdown  2. Assess vascular access sites hourly  3. Every 4-6 hours minimum:  Change oxygen saturation probe site  4. Every 4-6 hours:  If on nasal continuous positive airway pressure, respiratory therapy assess nares and determine need for appliance change or resting period.   8/9/2022 1446 by Leigh Rodriguez RN  Outcome: Progressing     Problem: Safety - Adult  Goal: Free from fall injury  8/9/2022 1446 by Leigh Rodriguez RN  Outcome: Progressing     Problem: ABCDS Injury Assessment  Goal: Absence of physical injury  8/9/2022 1446 by Leigh Rodriguez RN  Outcome: Progressing

## 2022-08-09 NOTE — PROCEDURES
Bronchoscopy Inpatient Procedure Note    Date of Procedure: 8/9/2022      Anesthesia: Conscious Sedation. Total Dose: Propofol intermittent dosing given total of 110 mcg  Fentanyl - on gtt @ 25 mcg/hr  Sedation time 30 minutes    Procedure: Flexible fiberoptic bronchoscopy with therapeutic suction of multiple blood clots/mucous plugs     Estimated Blood Loss: Minimal    Complications: None      Consent to Procedure  Not obtained, 2 physicians signed due to absence of [place] care POA     Description of Procedure   Joanna Rodgers was monitored  standard ICU monitoring devices. Mary Kay and the procedure were verified as Flexible Fiberoptic Bronchoscopy. A Time Out was held and the above information confirmed. The patient was placed in the appropriate position. The patient was sedated. The bronchoscope was then passed through ET tube    After careful inspection of the tracheal, the bronchoscope was sequentially passed into all segments of the left and right endobronchial trees to the second and/or third divisions. Endobronchial findings    Air ways are moderately inflamed and edematous both sides,   There was multiple right side mucous plugs and blood clots obstructing the right main bronchus and multiple RML and RLL bronchi, after suctioning, there is still remaining blood closts/? Foreign material in the RLL anterior segment bronchus.     Patient tolerated procedure well with no immediate complications

## 2022-08-09 NOTE — PROGRESS NOTES
Hospitalist Progress Note      PCP: Isa Rosado MD    Date of Admission: 8/4/2022    Subjective: cont to be intubated, had bronch today    Medications:  Reviewed    Infusion Medications    propofol      norepinephrine Stopped (08/09/22 0940)    sodium chloride      fentaNYL 50 mcg/hr (08/09/22 1435)     Scheduled Medications    metoprolol tartrate  25 mg Oral BID    sodium chloride flush  5-40 mL IntraVENous 2 times per day    docusate  100 mg Oral Daily    cefepime  2,000 mg IntraVENous Q12H    chlorhexidine  15 mL Mouth/Throat BID    enoxaparin  40 mg SubCUTAneous Daily    iron sucrose  200 mg IntraVENous Daily    folic acid  1 mg Oral Daily    aspirin  81 mg Oral Daily    levETIRAcetam  500 mg IntraVENous Q12H    pantoprazole (PROTONIX) 40 mg injection  40 mg IntraVENous Q12H    atorvastatin  40 mg Oral Daily    clopidogrel  75 mg Oral Daily    donepezil  10 mg Oral Nightly    QUEtiapine  50 mg Oral Nightly    tamsulosin  0.8 mg Oral Daily    memantine  10 mg Oral BID    lactobacillus  1 capsule Oral BID      PRN Meds: sodium chloride flush, sodium chloride, sodium phosphate IVPB **OR** sodium phosphate IVPB, potassium chloride, fentaNYL **AND** fentaNYL, fentanNYL, magnesium sulfate, promethazine **OR** ondansetron, acetaminophen **OR** acetaminophen, perflutren lipid microspheres      Intake/Output Summary (Last 24 hours) at 8/9/2022 1542  Last data filed at 8/9/2022 1435  Gross per 24 hour   Intake 4229.69 ml   Output 1210 ml   Net 3019.69 ml       Physical Exam Performed:    BP (!) 94/55   Pulse 94   Temp 99.6 °F (37.6 °C) (Axillary)   Resp 12   Ht 5' 9\" (1.753 m)   Wt 141 lb 1.5 oz (64 kg)   SpO2 93%   BMI 20.84 kg/m²       Intubated on full vent support  Wakes up and follows commands  Lungs CTA b/l  Abdomen soft and non tender  CVS s1 and s2 rrr  No edema    Labs:   Recent Labs     08/07/22  0415 08/08/22  0415 08/09/22  0356   WBC 11.6* 9.4 9.1   HGB 7.9* 7.2* 7.2*   HCT 23.6* 21.2* 21.7*    142 162     Recent Labs     08/07/22  0415 08/07/22  1255 08/08/22  0415 08/08/22  0955 08/08/22  1355 08/09/22  0356     --  140  --   --  144   K 3.5   < > 3.1* 4.0  --  4.0     --  108  --   --  111*   CO2 22  --  25  --   --  24   BUN 39*  --  37*  --   --  44*   CREATININE 0.8  --  0.8  --   --  0.9   CALCIUM 7.8*  --  7.2*  --   --  7.4*   PHOS 2.1*   < > 1.5*  --  2.9 2.2*    < > = values in this interval not displayed. No results for input(s): AST, ALT, BILIDIR, BILITOT, ALKPHOS in the last 72 hours. No results for input(s): INR in the last 72 hours. No results for input(s): Bettye Kaska in the last 72 hours. Urinalysis:      Lab Results   Component Value Date/Time    NITRU Negative 08/04/2022 10:45 AM    WBCUA 2 08/04/2022 10:45 AM    BACTERIA None Seen 08/04/2022 10:45 AM    RBCUA 4 08/04/2022 10:45 AM    BLOODU Negative 08/04/2022 10:45 AM    SPECGRAV 1.064 08/04/2022 10:45 AM    GLUCOSEU Negative 08/04/2022 10:45 AM       Radiology:  XR CHEST PORTABLE   Final Result   Status post right PICC line placement with the tip at the cavoatrial junction. ET tube and gastric tube in good position. Stable mild cardiomegaly with slowly resolving pulmonary edema. Increasing left perihilar opacity extending inferiorly with slowly resolving   opacity on the right      Small right pleural effusion which is more prominent         XR CHEST PORTABLE   Final Result   Chest: Nasogastric tube extends to the abdominal left upper quadrant. No significant interval change in bibasilar airspace disease or lateral right   apical pleuroparenchymal thickening as compared to prior. Head: Atrophy and small vessel ischemic disease. CT HEAD WO CONTRAST   Final Result   Chest: Nasogastric tube extends to the abdominal left upper quadrant.       No significant interval change in bibasilar airspace disease or lateral right   apical pleuroparenchymal thickening as compared to prior. Head: Atrophy and small vessel ischemic disease. XR CHEST PORTABLE   Final Result   Tubes as above. Worsening bilateral interstitial opacities. XR CHEST PORTABLE   Final Result   Tip of endotracheal tube is high in position, estimated 7 cm proximal to the   farheen. Suggest repositioning. Airspace disease within the mid to lower lung zones bilaterally, right   greater than left. Small right pleural effusion. Findings were called by the radiology call center. Assessment/Plan:    Active Hospital Problems    Diagnosis     Multiple tracheobronchial mucus plugs [T17.800A]      Priority: Medium    Septic shock (HCC) [A41.9, R65.21]      Priority: Medium    Acute respiratory failure with hypoxia (HCC) [J96.01]      Priority: Medium    Pneumonia of right lower lobe due to infectious organism [J18.9]      Priority: Medium    Metabolic encephalopathy [N10.06]      Priority: Medium    NSTEMI (non-ST elevated myocardial infarction) ([facility] [address] [I21.4]      Priority: Medium         Acute resp failure with hypoxia : s/p intubation  Vent management by critical care team     Right sided PNA, suspect aspiration : underwent bronchoscopy on 8/4/2022 by pulmo. Follow up cx. Cont abx . Aggressive pulmo toilet. S/p bronch 8/9/22     Episode of Afib RVR yesterday , resolved , started home dose of BB. Sepsis/septic shock , POA : resolved, off pressors  Sepsis due to Right sided PNA, POA with associated end organ failure as shown by acute resp failure with hypoxia     Nstemi: cont cardiac meds, follow up with cardiology recomm     Anemia : unmasked with IVF hydration: iron deficient. IV iron and folic acid. Consider GI input if needed.      Supportive care  Q2h turns  Aspiration precautions       Diet: ADULT TUBE FEEDING; Orogastric; Peptide Based; Continuous; 20; Yes; 10; Q 4 hours; 65; 100; Q 4 hours  Code Status: Full Code    PT/OT Eval Status: not ordered    Dispo - Ora Rodarte MD

## 2022-08-09 NOTE — PLAN OF CARE
Problem: Discharge Planning  Goal: Discharge to home or other facility with appropriate resources  Outcome: Progressing  Flowsheets  Taken 8/8/2022 2015 by Martin Thurston RN  Discharge to home or other facility with appropriate resources: Identify barriers to discharge with patient and caregiver  Taken 8/8/2022 1200 by Jolanta Soler RN  Discharge to home or other facility with appropriate resources:   Identify barriers to discharge with patient and caregiver   Identify discharge learning needs (meds, wound care, etc)   Refer to discharge planning if patient needs post-hospital services based on physician order or complex needs related to functional status, cognitive ability or social support system   Arrange for needed discharge resources and transportation as appropriate     Problem: Safety - Medical Restraint  Goal: Remains free of injury from restraints (Restraint for Interference with Medical Device)  Description: INTERVENTIONS:  1. Determine that other, less restrictive measures have been tried or would not be effective before applying the restraint  2. Evaluate the patient's condition at the time of restraint application  3. Inform patient/family regarding the reason for restraint  4.  Q2H: Monitor safety, psychosocial status, comfort, nutrition and hydration  Outcome: Progressing  Flowsheets (Taken 8/8/2022 1200 by Jolanta Soler RN)  Remains free of injury from restraints (restraint for interference with medical device):   Determine that other, less restrictive measures have been tried or would not be effective before applying the restraint   Every 2 hours: Monitor safety, psychosocial status, comfort, nutrition and hydration     Problem: Nutrition Deficit:  Goal: Optimize nutritional status  8/9/2022 0151 by Martin Thurston RN  Outcome: Progressing  8/8/2022 1214 by Sneha Dimas RD, LD  Outcome: Progressing     Problem: Pain  Goal: Verbalizes/displays adequate comfort level or baseline comfort level  Outcome: Progressing  Flowsheets (Taken 8/8/2022 1200 by Bobby Runner, RN)  Verbalizes/displays adequate comfort level or baseline comfort level: Assess pain using appropriate pain scale     Problem: Skin/Tissue Integrity  Goal: Absence of new skin breakdown  Description: 1. Monitor for areas of redness and/or skin breakdown  2. Assess vascular access sites hourly  3. Every 4-6 hours minimum:  Change oxygen saturation probe site  4. Every 4-6 hours:  If on nasal continuous positive airway pressure, respiratory therapy assess nares and determine need for appliance change or resting period.   Outcome: Progressing     Problem: Safety - Adult  Goal: Free from fall injury  Outcome: Progressing  Flowsheets (Taken 8/8/2022 2021)  Free From Fall Injury: Instruct family/caregiver on patient safety     Problem: ABCDS Injury Assessment  Goal: Absence of physical injury  Outcome: Progressing  Flowsheets (Taken 8/8/2022 2021)  Absence of Physical Injury: Implement safety measures based on patient assessment

## 2022-08-10 NOTE — PROGRESS NOTES
Hospitalist Progress Note      PCP: Belkis Zambrano MD    Date of Admission: 8/4/2022    Subjective: cont to spike a fever, still intubated.  Had bronch today too    Medications:  Reviewed    Infusion Medications    propofol      norepinephrine 5 mcg/min (08/10/22 1447)    sodium chloride      fentaNYL Stopped (08/10/22 0018)     Scheduled Medications    propofol  120 mg IntraVENous Once    cefepime  2,000 mg IntraVENous Q12H    metoprolol tartrate  25 mg Oral BID    sodium chloride flush  5-40 mL IntraVENous 2 times per day    docusate  100 mg Oral Daily    chlorhexidine  15 mL Mouth/Throat BID    enoxaparin  40 mg SubCUTAneous Daily    folic acid  1 mg Oral Daily    aspirin  81 mg Oral Daily    levETIRAcetam  500 mg IntraVENous Q12H    pantoprazole (PROTONIX) 40 mg injection  40 mg IntraVENous Q12H    atorvastatin  40 mg Oral Daily    clopidogrel  75 mg Oral Daily    donepezil  10 mg Oral Nightly    QUEtiapine  50 mg Oral Nightly    tamsulosin  0.8 mg Oral Daily    memantine  10 mg Oral BID    lactobacillus  1 capsule Oral BID      PRN Meds: sodium chloride flush, sodium chloride, sodium phosphate IVPB **OR** sodium phosphate IVPB, potassium chloride, fentaNYL **AND** fentaNYL, fentanNYL, magnesium sulfate, promethazine **OR** ondansetron, acetaminophen **OR** acetaminophen, perflutren lipid microspheres      Intake/Output Summary (Last 24 hours) at 8/10/2022 1502  Last data filed at 8/10/2022 1447  Gross per 24 hour   Intake 2575.09 ml   Output 1283 ml   Net 1292.09 ml       Physical Exam Performed:    /88   Pulse (!) 111   Temp 100 °F (37.8 °C) (Oral)   Resp 27   Ht 5' 9\" (1.753 m)   Wt 145 lb 8.1 oz (66 kg)   SpO2 95%   BMI 21.49 kg/m²       Intubated on full vent support  Wakes up and follows commands  Lungs CTA b/l  Abdomen soft and non tender  CVS s1 and s2 rrr  No edema    Labs:   Recent Labs     08/08/22  0415 08/09/22  0356 08/10/22  0450   WBC 9.4 9.1 12.3*   HGB 7.2* 7.2* 7.2*   HCT 21.2* 21.7* 21.6*    162 174     Recent Labs     08/08/22  0415 08/08/22  0955 08/08/22  1355 08/09/22  0356 08/10/22  0450     --   --  144 141   K 3.1* 4.0  --  4.0 4.1     --   --  111* 109   CO2 25  --   --  24 23   BUN 37*  --   --  44* 44*   CREATININE 0.8  --   --  0.9 1.0   CALCIUM 7.2*  --   --  7.4* 7.7*   PHOS 1.5*  --  2.9 2.2* 1.9*     No results for input(s): AST, ALT, BILIDIR, BILITOT, ALKPHOS in the last 72 hours. No results for input(s): INR in the last 72 hours. No results for input(s): Debbi Mould in the last 72 hours. Urinalysis:      Lab Results   Component Value Date/Time    NITRU Negative 08/04/2022 10:45 AM    WBCUA 2 08/04/2022 10:45 AM    BACTERIA None Seen 08/04/2022 10:45 AM    RBCUA 4 08/04/2022 10:45 AM    BLOODU Negative 08/04/2022 10:45 AM    SPECGRAV 1.064 08/04/2022 10:45 AM    GLUCOSEU Negative 08/04/2022 10:45 AM       Radiology:  XR CHEST PORTABLE   Final Result   Status post right PICC line placement with the tip at the cavoatrial junction. ET tube and gastric tube in good position. Stable mild cardiomegaly with slowly resolving pulmonary edema. Increasing left perihilar opacity extending inferiorly with slowly resolving   opacity on the right      Small right pleural effusion which is more prominent         XR CHEST PORTABLE   Final Result   Chest: Nasogastric tube extends to the abdominal left upper quadrant. No significant interval change in bibasilar airspace disease or lateral right   apical pleuroparenchymal thickening as compared to prior. Head: Atrophy and small vessel ischemic disease. CT HEAD WO CONTRAST   Final Result   Chest: Nasogastric tube extends to the abdominal left upper quadrant. No significant interval change in bibasilar airspace disease or lateral right   apical pleuroparenchymal thickening as compared to prior. Head: Atrophy and small vessel ischemic disease.          XR CHEST

## 2022-08-10 NOTE — PROCEDURES
Bronchoscopy Inpatient Procedure Note    Date of Procedure: 8/10/2022      Anesthesia: Conscious Sedation. Total Dose: Propofol intermittent dosing given total of 100 mcg  intermittent   Sedation time 30 minutes    Procedure: Flexible fiberoptic bronchoscopy diagnostic     Estimated Blood Loss: Minimal    Complications: None      Consent to Procedure  Not obtained, 2 physicians signed due to absence of [place] care POA     Description of Procedure   Zebedee Area was monitored  standard ICU monitoring devices. Tatiana Oddi and the procedure were verified as Flexible Fiberoptic Bronchoscopy. A Time Out was held and the above information confirmed. The patient was placed in the appropriate position. The patient was sedated. The bronchoscope was then passed through ET tube    After careful inspection of the tracheal, the bronchoscope was sequentially passed into all segments of the left and right endobronchial trees to the second and/or third divisions.     Endobronchial findings    Air ways are moderately inflamed and edematous both sides,   There was multiple mucous plugs thick and difficult to suction, there was no blood clots and there was still minor active fresh bleeding from the RLL and medial side of the farheen (likely suction injury)    Patient tolerated procedure well with no immediate complications

## 2022-08-10 NOTE — CARE COORDINATION
medical conditions he has at this time. I did explain as his only living blood relative we are reaching out to her to help with decision making. She did feel the plan the physicians have in place is reasonable and agreed. Dr Vicky Wheeler, Dr Karen Rizzo and Darline Stearns bedside nurse informed of above orders noted. Code Status: Limited   Discharge Environment:    [x] Other: Group home     Teaching Time:  2hours      I will continue to follow Mr. Morales's care as needed. Thank you for allowing me to participate in the care of Mr. Ethan Pineda .      Electronically signed by Chuck Alexander RN, BSN,CHPN on 8/10/2022 at 1:59 PM  [address] [place] [address]  Office: [phone]

## 2022-08-10 NOTE — PROGRESS NOTES
MD placed patient on SBT earlier this AM before bronchoscopy. Patient tolerated well. During/after bronchoscopy patient put on full support for procedure. About 20 minutes later patient kept  breathing over the vent and had a increased respiratory rate. Patient placed back on SBT, tolerated well. About 30 minutes later RN called and said patient desaturated to low 80's and was not coming up to >88% even while o2 was increased. MD notified patient flipped back to original settings and tolerating well. Will continue to monitor.

## 2022-08-11 NOTE — PROGRESS NOTES
Made DNR CC, no vapotherm nor pressors, confirmed discussion with Dr. Austin Silver, myself, and current [address] [facility] [address] to go ahead with order change.

## 2022-08-11 NOTE — PROCEDURES
Bronchoscopy Inpatient Procedure Note    Date of Procedure: 8/11/2022      Anesthesia: Conscious Sedation. Total Dose: Propofol intermittent dosing given total of 50 mcg  intermittent   Sedation time 30 minutes    Procedure: Flexible fiberoptic bronchoscopy therapeutic with mucous plugs suctioning     Estimated Blood Loss: Minimal    Complications: None      Consent to Procedure  Not obtained, 2 physicians signed due to absence of meir care POA     Description of Procedure   Jessika Bauer was monitored  standard ICU monitoring devices. Nguyen Ríos and the procedure were verified as Flexible Fiberoptic Bronchoscopy. A Time Out was held and the above information confirmed. The patient was placed in the appropriate position. The patient was sedated. The bronchoscope was then passed through ET tube    After careful inspection of the tracheal, the bronchoscope was sequentially passed into all segments of the left and right endobronchial trees to the second and/or third divisions.     Endobronchial findings    Air ways are moderately inflamed and edematous both sides,   There was multiple mucous plugs thick and difficult to suction, there was no blood clots and there was still minor active fresh bleeding from the RLL and medial side of the farheen (likely suction injury)    Patient tolerated procedure well with no immediate complications

## 2022-08-11 NOTE — PROGRESS NOTES
Pt BP 80s/40s. Conflicting orders regarding pressors and plan of care. Call placed to Dr. Halle Lara. Dr. Halle Lara discussed case with Dr. Lynette Oconnell via telephone. Order received for pt to become DNR-CC, no pressors to be started at this time. Per Dr. Quang Arrington, remove Vapotherm and start 2L NC for comfort.      Electronically signed by Haily Knutson RN on 8/11/2022 at 7:39 PM

## 2022-08-11 NOTE — PROGRESS NOTES
Pulmonary Critical Care Progress Note     Patient's name:  Meenakshi Alejandra  Medical Record Number: [id number]  Patient's account/billing number: [de-identified]  Patient's YOB: 1940  Age: [de-identified] y.o. Date of Admission: 8/4/2022  4:16 AM  Date of Consult: 8/11/2022      Primary Care Physician: Belkis Zambrano MD      Code Status: Limited    Chief complaint: Acute respiratory failure with hypoxia    Assessment and Plan     Acute respiratory with hypoxia status post intubation mechanical ventilation. Multiple Mucous plugs on the right, with blood clots   Aspiration pneumonia right lower lobe  Metabolic encephalopathy with underlying dementia. Chronic CHF with EF of 35%. Chronic atrial fibrillation. Non-ST elevation myocardial infarction. Septic shock       Plan:  SBT if tolerated will extubate, his code status was changed to no intubation if he failed extubation no plan to re intubate. S/p bronch with suction of multiple mucous plugs suctioned and culture sent, started on mucomyst  Continue antibiotics  Lasix prn  Pressors to keep map > 65  Bronchodilators scheduled. GI and DVT prophylaxis. Overnight:  On the vent  No fever      REVIEW OF SYSTEMS:  Unable to obtain intubated on mechanical ventilation. Physical Exam:    Vitals: BP (!) 107/55   Pulse (!) 119   Temp 99.2 °F (37.3 °C) (Oral)   Resp 22   Ht 5' 9\" (1.753 m)   Wt 147 lb 14.9 oz (67.1 kg)   SpO2 97%   BMI 21.85 kg/m²     Last Body weight:   Wt Readings from Last 3 Encounters:   08/11/22 147 lb 14.9 oz (67.1 kg)   07/12/16 161 lb (73 kg)   11/02/11 180 lb (81.6 kg)       Body Mass Index : Body mass index is 21.85 kg/m². Intake and Output summary:   Intake/Output Summary (Last 24 hours) at 8/11/2022 1136  Last data filed at 8/11/2022 1009  Gross per 24 hour   Intake 3017.46 ml   Output 1595 ml   Net 1422.46 ml       Physical Examination:     Gen: Sedated on mechanical ventilation  Eyes: PERRL. Anicteric sclera.  No conjunctival injection. ENT: No discharge. Posterior oropharynx clear. External appearance of ears and nose normal.  Neck: Trachea midline. No mass   Resp: Bilateral scattered rhonchi no active wheezing  CV: Regular rate. Regular rhythm. No murmur or rub. No edema. GI: Soft, Non-tender. Non-distended. +BS  Skin: Warm, dry, w/o erythema. Lymph: No cervical or supraclavicular LAD. M/S: No cyanosis. No clubbing. Neuro: Sedated on mechanical ventilation, no focal deficit. Laboratory findings:-    CBC:   Recent Labs     08/11/22  0528   WBC 13.4*   HGB 7.3*        BMP:    Recent Labs     08/09/22  0356 08/10/22  0450 08/11/22  0528    141 142   K 4.0 4.1 4.0   * 109 110   CO2 24 23 24   BUN 44* 44* 48*   CREATININE 0.9 1.0 0.9   GLUCOSE 170* 165* 179*     S. Calcium:  Recent Labs     08/11/22 0528   CALCIUM 7.9*       S. Magnesium:  Recent Labs     08/11/22  0528   MG 2.00     S. Phosphorus:  Recent Labs     08/11/22  0528   PHOS 2.2*     S. Glucose:  Recent Labs     08/10/22  2357 08/11/22  0441 08/11/22  0757   POCGLU 172* 131* 137*       Radiology Review:  Pertinent images / reports were reviewed as a part of this visit.       CC time 35 minutes                     Mark Anthony Moe MD, M.MADELAINE.            8/11/2022, 11:36 AM

## 2022-08-11 NOTE — PROGRESS NOTES
12.3* 13.4*   HGB 7.2* 7.2* 7.3*   HCT 21.7* 21.6* 21.5*    174 178     Recent Labs     08/09/22  0356 08/10/22  0450 08/11/22  0528    141 142   K 4.0 4.1 4.0   * 109 110   CO2 24 23 24   BUN 44* 44* 48*   CREATININE 0.9 1.0 0.9   CALCIUM 7.4* 7.7* 7.9*   PHOS 2.2* 1.9* 2.2*     No results for input(s): AST, ALT, BILIDIR, BILITOT, ALKPHOS in the last 72 hours. No results for input(s): INR in the last 72 hours. No results for input(s): Consuelo Fuchs in the last 72 hours. Urinalysis:      Lab Results   Component Value Date/Time    NITRU Negative 08/04/2022 10:45 AM    WBCUA 2 08/04/2022 10:45 AM    BACTERIA None Seen 08/04/2022 10:45 AM    RBCUA 4 08/04/2022 10:45 AM    BLOODU Negative 08/04/2022 10:45 AM    SPECGRAV 1.064 08/04/2022 10:45 AM    GLUCOSEU Negative 08/04/2022 10:45 AM       Radiology:  XR CHEST PORTABLE   Final Result   Status post right PICC line placement with the tip at the cavoatrial junction. ET tube and gastric tube in good position. Stable mild cardiomegaly with slowly resolving pulmonary edema. Increasing left perihilar opacity extending inferiorly with slowly resolving   opacity on the right      Small right pleural effusion which is more prominent         XR CHEST PORTABLE   Final Result   Chest: Nasogastric tube extends to the abdominal left upper quadrant. No significant interval change in bibasilar airspace disease or lateral right   apical pleuroparenchymal thickening as compared to prior. Head: Atrophy and small vessel ischemic disease. CT HEAD WO CONTRAST   Final Result   Chest: Nasogastric tube extends to the abdominal left upper quadrant. No significant interval change in bibasilar airspace disease or lateral right   apical pleuroparenchymal thickening as compared to prior. Head: Atrophy and small vessel ischemic disease. XR CHEST PORTABLE   Final Result   Tubes as above.       Worsening bilateral interstitial opacities. XR CHEST PORTABLE   Final Result   Tip of endotracheal tube is high in position, estimated 7 cm proximal to the   farheen. Suggest repositioning. Airspace disease within the mid to lower lung zones bilaterally, right   greater than left. Small right pleural effusion. Findings were called by the radiology call center. Assessment/Plan:    Active Hospital Problems    Diagnosis     Multiple tracheobronchial mucus plugs [T17.800A]      Priority: Medium    Septic shock (HCC) [A41.9, R65.21]      Priority: Medium    Acute respiratory failure with hypoxia (HCC) [J96.01]      Priority: Medium    Pneumonia of right lower lobe due to infectious organism [J18.9]      Priority: Medium    Metabolic encephalopathy [W23.38]      Priority: Medium    NSTEMI (non-ST elevated myocardial infarction) ([facility] [address] [I21.4]      Priority: Medium       Acute resp failure with hypoxia : s/p intubation  Vent management by critical care team  on SBT  Received a dose of lasix       Right sided PNA, suspect aspiration : underwent bronchoscopy on 8/4/2022 by pulmo. Follow up cx. Cont abx . Aggressive pulmo toilet. S/p bronch 8/9/22 and 8/10/22  Mucus plugging  Cont IV abx       Episode of Afib RVR yesterday , resolved , started home dose of BB. Sepsis/septic shock , POA : resolved, off pressors  Sepsis due to Right sided PNA, POA with associated end organ failure as shown by acute resp failure with hypoxia       Nstemi: cont cardiac meds, follow up with cardiology recomm       Anemia : unmasked with IVF hydration: iron deficient. IV iron and folic acid. Consider GI input if needed. Supportive care  Q2h turns  Aspiration precautions           DVT Prophylaxis: lovenox  Diet: ADULT TUBE FEEDING; Orogastric; Peptide Based; Continuous; 20; Yes; 10; Q 4 hours; 65; 100; Q 4 hours  Code Status: Limited    PT/OT Eval Status: ordered    Dispo - cont care.  Palliative care consulted, discussed code status-->changed to shiva Beckford MD

## 2022-08-11 NOTE — CARE COORDINATION
[facility]  Palliative Care   Progress Note    NAME:  Dariel Ga RECORD NUMBER:  [id number]  AGE: [de-identified] y.o. GENDER: male  : 1940  TODAY'S DATE:  2022    Subjective: Patient unchanged on vent, had bronch continues with secretions. Objective:    Vitals:    22 1600   BP:    Pulse:    Resp:    Temp: 98.6 °F (37 °C)   SpO2:      Lab Results   Component Value Date    WBC 13.4 (H) 2022    HGB 7.3 (L) 2022    HCT 21.5 (L) 2022    MCV 91.5 2022     2022     Lab Results   Component Value Date    CREATININE 0.9 2022    BUN 48 (H) 2022     2022    K 4.0 2022     2022    CO2 24 2022     Lab Results   Component Value Date    ALT 38 2022    AST 44 (H) 2022    ALKPHOS 45 2022    BILITOT 0.4 2022       Plan: I met with Alexa and Joleen from [facility]. They did inform me he had COVID in Oct/2021 and . We discussed difficulty of getting him off ventilator and they both agree as did his sister that taking him off vent and keeping him comfortable is appropriate care for him. Discussed with Dr Dwight Parsons and Dr Chiquita Olguin for extubation and comfort care later today. Code Status: Limited  Discharge Environment:  [] Hospice Consult Agency:  [] Inpatient Hospice    [] Home with  [facility]   [] ECF with Hospice  [] ECF skilled care with Hospice to follow   [] Other:    Teaching Time:  1hours      I will continue to follow Mr. Morales's care as needed. Thank you for allowing me to participate in the care of Mr. Adriana Gongora .      Electronically signed by Kristi Zavala RN, BSN,CHPN on 2022 at 4:44 PM  [address] [place] [address]  Office: [phone]

## 2022-08-11 NOTE — PROGRESS NOTES
Comprehensive Nutrition Assessment    Type and Reason for Visit:  Reassess    Nutrition Recommendations/Plan:   Continue TF, Vital 1.2 @ 65 mL/hr, flush 100 mL q 4 hrs. Malnutrition Assessment:  Malnutrition Status:  Insufficient data (08/04/22 1132)      Nutrition Assessment:    Follow-up. Pt remains intubated, failed SBT this AM after bronchoscopy. TF running @ goal rate and tolerating well. No sedation on board. Will continue current nutrition interventions. Nutrition Related Findings:    +2 BUE edema; BM 8/11; Glu 137, Phos 2.2 Wound Type: Skin Tears       Current Nutrition Intake & Therapies:    Average Meal Intake: NPO  Average Supplements Intake: NPO  ADULT TUBE FEEDING; Orogastric; Peptide Based; Continuous; 20; Yes; 10; Q 4 hours; 65; 100; Q 4 hours  Current Tube Feeding (TF) Orders:  Feeding Route: Nasogastric  Formula: Peptide Based  Schedule: Continuous  Feeding Regimen: Vital 1.2 AF @ 65ml/hr  Additives/Modulars:  none  Water Flushes: per provider, 100 mL q 4 hrs  Current TF & Flush Orders Provides: See goal.  Goal TF & Flush Orders Provides: Vital 1.2 AF at goal rate 65ml/hr. This EN regimen provides 1300ml TV, 1560 kcals, 98 grams protein, and 1054ml free water. (Calculated x 20 hours to account for routine nursing care)    Anthropometric Measures:  Height: 5' 9\" (175.3 cm)  Ideal Body Weight (IBW): 160 lbs (73 kg)    Admission Body Weight: 137 lb (62.1 kg)  Current Body Weight: 147 lb (66.7 kg), 91.9 % IBW. Weight Source: Bed Scale  Current BMI (kg/m2): 21.7        Weight Adjustment For: No Adjustment                 BMI Categories: Normal Weight (BMI 18.5-24. 9)    Estimated Daily Nutrient Needs:  Energy Requirements Based On: Kcal/kg  Weight Used for Energy Requirements: Current  Energy (kcal/day): 2078-3484 (25-30kcal/62kg)  Weight Used for Protein Requirements: Current  Protein (g/day):  (1.2-2.0g/62kg)  Method Used for Fluid Requirements: Other (Comment)  Fluid (ml/day): per provider    Nutrition Diagnosis:   Inadequate oral intake related to impaired respiratory function as evidenced by intubation    Nutrition Interventions:   Food and/or Nutrient Delivery: Continue Current Tube Feeding  Nutrition Education/Counseling: Education not indicated  Coordination of Nutrition Care: Continue to monitor while inpatient       Goals:  Previous Goal Met: Goal(s) Achieved  Goals: Tolerate nutrition support at goal rate  Specify Other Goals: Initiate most appropriate form of nutrition by next RD assessment    Nutrition Monitoring and Evaluation:   Behavioral-Environmental Outcomes: None Identified  Food/Nutrient Intake Outcomes: Enteral Nutrition Intake/Tolerance  Physical Signs/Symptoms Outcomes: Biochemical Data, Chewing or Swallowing, Fluid Status or Edema, Hemodynamic Status, Skin, Weight, Nutrition Focused Physical Findings    Discharge Planning:     Too soon to determine     Cheryl Jane RD, LD  Contact: [phone]

## 2022-08-11 NOTE — SIGNIFICANT EVENT
Code status discussed with palliative care who discussed it with Mariposa Davila and Corazon Pal at [facility], decision was made to extubate with NO re intubation  Code status is limited with no cpr and no re intubation no bipap   but OK with every thing else. Vabotherm to keep sat > 92%  Precedex for agitation  Aspiration precautions  Lasix prn  Continue antibiotics  Continue mucomyst and nebulized       Meeta Pringle M.D.   Pulmonary Critical Care Department

## 2022-08-12 NOTE — CARE COORDINATION
Discharge Planning:  AMELIE contacted Joleen at the [facility]) [phone]. Joleen stated that she has talked with her co worker, Mark Vargas [phone]. Joleen stated that Mark Vargas is currently working on reaching out to pts estranged sister to see if she will sign consents for hospice. AMELIE gave the number for the on call case management team this weekend as well as this [employer] number to progress a d/c plan as appropriate. Plan: Undetermined. Haileeare is attempting to get in touch with pts estranged sister to see if she will sign for hospice so pt can return to his group home. Pt will need oxygen upon d/c, so will need a home O2 eval will need home care if pt does not elect to sign up with hospice services.    KANA Beckett  [phone]  Electronically signed by Yon Ramos on 8/12/2022 at 4:05 PM

## 2022-08-12 NOTE — CARE COORDINATION
[facility]  Palliative Care   Progress Note    NAME:  Dariel Ga RECORD NUMBER:  [id number]  AGE: [de-identified] y.o. GENDER: male  : 1940  TODAY'S DATE:  2022    Subjective: Patient off vent, on nasal oxygen at 2L, no response, VSS    Objective:    Vitals:    22 1200   BP: 116/72   Pulse: (!) 157   Resp: 22   Temp: 97.7 °F (36.5 °C)   SpO2: 90%     Lab Results   Component Value Date    WBC 13.4 (H) 2022    HGB 7.3 (L) 2022    HCT 21.5 (L) 2022    MCV 91.5 2022     2022     Lab Results   Component Value Date    CREATININE 0.9 2022    BUN 48 (H) 2022     2022    K 4.0 2022     2022    CO2 24 2022     Lab Results   Component Value Date    ALT 38 2022    AST 44 (H) 2022    ALKPHOS 45 2022    BILITOT 0.4 2022       Plan: Patient is ready for hospice at home. I reached out to his sister Jose [phone] to see if she would be agreeable to signing consents for hospice (it is a medicare guideline that either patient needs to sign consents or HPOA or legal guardian). I did call Joleen at group home she will contact the administration and see what they can do to facilitate a discharge. I did suggest possible home care to at least get him home with his \"family\". I have called Luba PISANO for his case here in hospital and relayed all of above information to help facilitate discharge. His sister has not returned call      Code Status: DNR-CC  Discharge Environment:  [] Hospice Consult Agency:  [] Inpatient Hospice    [] Home with [facility]   [] ECF with Hospice  [] ECF skilled care with Hospice to follow   [] Other:    Teaching Time:  1 hour    I will continue to follow Mr. Morales's care as needed. Thank you for allowing me to participate in the care of Mr. Chriss Raymond .      Electronically signed by Caitlin Acuna, RN, BSN,CHPN on 2022 at 1:33 PM  Palliative Care Nurse [facility]  Office: [phone]

## 2022-08-13 NOTE — PROGRESS NOTES
Patient unresponsive overnight, HR sustained 160's. RR labored, tachypneic and irregular. Does not withdrawal any extremity to pain, extremities stiff and rigid. Did not withdrawal to NT suction. 2L NC in place for comfort. PRN morphine/ativan for increased WOB and s/s pain. Adequate UOP per mott catheter. Lung sounds with coarse bilateral rhonchi and upper airway congestion with weak cough. Multiple attempts at NT and subglottic suction poorly effective in obtaining secretions. Scopolamine patch ordered per Dr. Naomie Garcia. L PIC removed. Bath given, hair washed, linens changed. Smear of a BM. NGT remains intact and clamped. [facility] notified of case, please call back with cardiac TOD.

## 2022-08-13 NOTE — PROGRESS NOTES
SpO2 decreasing to 60's. Patient remains unresponsive to painful stimuli. Coarse rhonchi auscultated throughout lung fields and weak nonproductive cough noted. Nasal trumpet placed in Left Nare without difficulty. NT suctioning for scant amount of thick/tenacious yellow secretions. Oral suctioning with yankauer for scant secretions. SpO2 82-83% presently.   Electronically signed by Elisabet Malone RN on 8/13/2022 at 1:21 PM

## 2022-08-13 NOTE — PLAN OF CARE
chronic conditions and co-morbidity symptoms are monitored and maintained or improved  Outcome: Not Progressing

## 2022-08-13 NOTE — PLAN OF CARE
Problem: Discharge Planning  Goal: Discharge to home or other facility with appropriate resources  8/13/2022 1055 by Angelika Martínez RN  Outcome: Not Progressing  Flowsheets (Taken 8/13/2022 0900)  Discharge to home or other facility with appropriate resources:   Identify barriers to discharge with patient and caregiver   Arrange for needed discharge resources and transportation as appropriate   Identify discharge learning needs (meds, wound care, etc)   Refer to discharge planning if patient needs post-hospital services based on physician order or complex needs related to functional status, cognitive ability or social support system  8/13/2022 0319 by Tito Munoz RN  Outcome: Not Progressing     Problem: Nutrition Deficit:  Goal: Optimize nutritional status  8/13/2022 0731 by Angelika Martínez RN  Outcome: Not Progressing  Remains NPO at present time. 8/13/2022 0319 by Tito Munoz RN  Outcome: Not Progressing    Problem: Pain  Goal: Verbalizes/displays adequate comfort level or baseline comfort level  8/13/2022 1055 by Angelika Martínez RN  Outcome: Not Progressing  Flowsheets (Taken 8/13/2022 0900)  Verbalizes/displays adequate comfort level or baseline comfort level:   Assess pain using appropriate pain scale   Administer analgesics based on type and severity of pain and evaluate response   Implement non-pharmacological measures as appropriate and evaluate response  8/13/2022 0319 by Tito Munoz RN  Outcome: Progressing  Flowsheets (Taken 8/12/2022 2000)  Verbalizes/displays adequate comfort level or baseline comfort level:   Assess pain using appropriate pain scale   Administer analgesics based on type and severity of pain and evaluate response     Problem: Skin/Tissue Integrity  Goal: Absence of new skin breakdown  Description: 1. Monitor for areas of redness and/or skin breakdown  2. Assess vascular access sites hourly  3. Every 4-6 hours minimum:  Change oxygen saturation probe site  4. Every 4-6 hours:  If on nasal continuous positive airway pressure, respiratory therapy assess nares and determine need for appliance change or resting period. Outcome: Not Progressing  Monitoring patient skin integrity for skin breakdown, turning and repositioning q2h per protocol. Patient dependent on staff for turning and repositioning swith pillow support. Problem: Safety - Adult  Goal: Free from fall injury  8/13/2022 1055 by Ricarda Hodges RN  Outcome: Not Progressing  Flowsheets (Taken 8/13/2022 1052)  Free From Fall Injury: Instruct family/caregiver on patient safety  Falling star program remains in place. Call light and personal belongings within reach. Frequent visual monitoring continues. Kim catheter in place. Patient dependent on staff for turning/repositioning at least once every 2 hours, and on a prn basis.   8/13/2022 0319 by Godfrey Salgado RN  Outcome: Progressing  Flowsheets (Taken 8/12/2022 2031)  Free From Fall Injury: Instruct family/caregiver on patient safety     Problem: ABCDS Injury Assessment  Goal: Absence of physical injury  8/13/2022 1055 by Ricarda Hodges RN  Outcome: Not Progressing  Flowsheets (Taken 8/13/2022 1052)  Absence of Physical Injury: Implement safety measures based on patient assessment  8/13/2022 0319 by Godfrey Salgado RN  Outcome: Progressing  Flowsheets (Taken 8/12/2022 2031)  Absence of Physical Injury: Implement safety measures based on patient assessment     Problem: Chronic Conditions and Co-morbidities  Goal: Patient's chronic conditions and co-morbidity symptoms are monitored and maintained or improved  8/13/2022 1055 by Ricarda Hodges RN  Outcome: Not Progressing  Flowsheets (Taken 8/13/2022 0900)  Care Plan - Patient's Chronic Conditions and Co-Morbidity Symptoms are Monitored and Maintained or Improved:   Monitor and assess patient's chronic conditions and comorbid symptoms for stability, deterioration, or improvement   Update acute care plan

## 2022-08-13 NOTE — CARE COORDINATION
Received call from Berto Stearns at [facility] ([phone]); sister has decided she will not be making any medical decisions. They have reached out to [facility] in [place] for consideration and assignment of a guardian but report this is likely a lengthy process.

## 2022-08-13 NOTE — PROGRESS NOTES
Hospital Medicine Progress Note     Date:  8/13/2022    PCP: Jen Palm MD (Tel: [phone])    Date of Admission: 8/4/2022    Chief complaint: Admitted with septic shock      Brief admission history: 41-year-old male with history of Alzheimer's dementia, microcytic/iron deficiency anemia, chronic combined systolic and diastolic heart failure (most recent echocardiogram with ejection fraction of 35 to 29%, grade 2 diastolic dysfunction), seizure disorder, hyperlipidemia, essential hypertension, MRDD, BPH, osteoarthritis, who was admitted on 8/4/2022 for management of possible cardiac versus septic shock, acute respiratory failure with hypoxia, acute on chronic combined systolic and diastolic heart failure, trifascicular block, acute metabolic encephalopathy (superimposed on Alzheimer's dementia, unspecified behavioral disorder). He required endotracheal intubation, eventually extubated while in ICU. While intubated in the ICU, he required bronchoscopic evaluation x2. Eventually treated for acute respiratory failure secondary to bacterial pneumonia, bacteria/aspiration pneumonia, septic shock secondary to pneumonia, NSTEMI. Assessment/plan:  Septic shock, severe sepsis secondary to bacterial pneumonia. Patient was treated with antibiotics, intravenous fluid administration and pressor support. Overall, patient does not appear to be improving and has since been confirmed to be DNR CC status and comfort care measures initiated while in ICU. Bacterial pneumonia/aspiration pneumonia. Management as noted above. Overall, patient's condition did not improve much. Acute respiratory failure with hypoxia. Secondary to pneumonia, required endotracheal intubation in the ICU, eventually extubated while still in ICU. Family later decided on comfort care measures. Acute metabolic encephalopathy (complicating underlying history of Alzheimer's dementia).   Likely secondary to acute infectious process. Atrial fibrillation with rapid ventricular response. HR sustaining in the 150s to 160s. Will try a dose of IV digoxin 250 mcg. Mucous plugging, noted during bronchoscopic evaluation while in ICU. NSTEMI. Treated with medical management while in ICU. Disposition. Patient is actively dying. He is a DNR-CC; however, so far no commitment to make him hospice. Diet: ADULT TUBE FEEDING; Orogastric; Peptide Based; Continuous; 20; Yes; 10; Q 4 hours; 65; 100; Q 4 hours    Code status: DNR-CC   ----------  Subjective  Unable to obtain. Objective  Physical exam:  Vitals: BP 83/61   Pulse (!) 162   Temp 99.5 °F (37.5 °C) (Oral)   Resp 19   Ht 5' 9\" (1.753 m)   Wt 146 lb 13.2 oz (66.6 kg)   SpO2 (!) 85%   BMI 21.68 kg/m²   Gen/overall appearance: Not in acute distress. Unresponsive. Head: Normocephalic, atraumatic  ENT: Oral mucosa moist  Neck: No JVD, thyromegaly  CVS: Nml S1S2, no MRG. Tachycardic. Pulm: Diffuse ronchi throughout  Gastrointestinal: Soft, NT/ND, +BS  Musculoskeletal: Bilateral upper extremity edema. Neuro: Unable to assess as patient is unresponsive. Psychiatry: Unable to assess as patient is unresponsive. Skin: Warm, dry with normal turgor. No rash  Capillary refill: Brisk,< 3 seconds   Peripheral Pulses: +2 palpable, equal bilaterally      24HR INTAKE/OUTPUT:    Intake/Output Summary (Last 24 hours) at 8/13/2022 9075  Last data filed at 8/13/2022 0430  Gross per 24 hour   Intake 90 ml   Output 850 ml   Net -760 ml       I/O last 3 completed shifts: In: 635.8 [I.V.:23.9; NG/GT:215; IV Piggyback:396.9]  Out: 8911 [Urine:2355; Other:65]  I/O this shift:   In: 0   Out: 470 [Urine:470]  Meds:    scopolamine  1 patch TransDERmal Q72H    digoxin  250 mcg IntraVENous Once    albuterol  2.5 mg Nebulization BID     Infusions:    sodium chloride       PRN Meds: LORazepam, morphine, sodium chloride flush, sodium chloride, sodium phosphate IVPB **OR** sodium phosphate IVPB, potassium chloride, fentanNYL, magnesium sulfate, promethazine **OR** ondansetron, acetaminophen **OR** acetaminophen, perflutren lipid microspheres    Labs/imaging:  CBC:   Recent Labs     08/11/22 0528   WBC 13.4*   HGB 7.3*          BMP:    Recent Labs     08/11/22 0528      K 4.0      CO2 24   BUN 48*   CREATININE 0.9   GLUCOSE 179*       Hepatic: No results for input(s): AST, ALT, ALB, BILITOT, ALKPHOS in the last 72 hours.     Tricia Hernández MD  -------------------------------  Rounding hospitalist

## 2022-08-14 NOTE — DEATH NOTES
Death Pronouncement Note  Patient's Name: Win Fernando   Patient's YOB: 1940  MRN Number: [id number]    Admitting Provider: Idalia Tello DO  Attending Provider: Quynh Pantoja MD    Patient was examined and the following were absent: Pulses, Blood Pressure, and Respiratory effort    I declared the patient dead on 08/13/2022 at [address] of Death: cardiogenic shock, acute respiratory failure      Electronically signed by Jose Daniel Thurman DO on 8/13/22 at 8:45 PM EDT

## 2022-08-14 NOTE — DISCHARGE SUMMARY
comfort care measures. Acute metabolic encephalopathy (complicating underlying history of Alzheimer's dementia). Likely secondary to acute infectious process. Atrial fibrillation with rapid ventricular response. Mucous plugging, noted during bronchoscopic evaluation while in ICU. He required multiple bronchoscopic evaluation while in ICU. NSTEMI. Treated with medical management while in ICU. Patient eventually pronounced  on 2022 at [address] hours. Preliminary cause of death is cardiogenic shock. Invasive procedures:  Bronchoscopy on 2022. Bronchoscopy on 8/10/2022. Bronchoscopy on 2022. Bronchoscopy on 2022. Endotracheal intubation. Discharge Diagnoses:   See above. Physical Exam: BP (!) 75/46   Pulse (!) 0   Temp 99.3 °F (37.4 °C) (Axillary)   Resp (!) 0   Ht 5' 9\" (1.753 m)   Wt 146 lb 9.7 oz (66.5 kg)   SpO2 (!) 23%   BMI 21.65 kg/m²   Not present at the time patient was pronounced . Significant diagnostic studies that may require follow up:  Echo Complete    Result Date: 2022  Transthoracic Echocardiography Report (TTE)  Demographics   Patient Name       Velia Wilkinson   Date of Study      2022         Gender              Male   Patient Number     [id number]         Date of Birth       1940   Visit Number       [id number]          Age                 [de-identified] year(s)   Accession Number   [id number]         Room Number         2122   Corporate ID       [id number]           Sonographer         Kemi Harrington RDCS,                                                            RDMS, CNMT, RT   Ordering Physician Yennifer Smith.,  Interpreting        Aaron Dobbs DO                 Physician           MD                                                            [address] [place] [address].  Procedure Type of Study   TTE procedure:ECHOCARDIOGRAM COMPLETE 2D Byet 64.   Procedure Date Date: 2022 Start: 11:30 AM Study Location: [facility] CRE Technical Quality: Limited visualization due to body habitus. Additional Indications:AMS and STEMI. Patient Status: Routine Height: 69 inches Weight: 137 pounds BSA: 1.76 m2 BMI: 20.23 kg/m2 BP: 105/42 mmHg  Conclusions   Summary  Normal LV size with preserved wall motion involving the basal septum. The  distal septum, anterior wall, apex and distal inferior walls are severely  hypokinetic. EF is 35-40%. Grade II diastolic dysfunction with elevated L  heart filling pressures. Mild left atrial dilation. Normal right ventricular size and function. Mild mitral, aortic and tricuspid regurgitation. There is a mild (1.4cm) pericardial effusion   Signature   ------------------------------------------------------------------  Electronically signed by Aaron Dobbs MD (Interpreting  physician) on 08/04/2022 at 02:20 PM  ------------------------------------------------------------------   Findings   Left Ventricle  Normal LV size with preserved wall motion involving the basal septum. The  distal septum, anterior wall, apex and distal inferior walls are severely  hypokinetic. EF is 35-40%. Grade II diastolic dysfunction with elevated L  heart filling pressures. Mitral Valve  Mitral valve is structurally normal.  Mild thickening of the mitral valve leaflets. Mild mitral regurgitation. No mitral stenosis. Left Atrium  Mild left atrial dilation. The left atrial volume index measures 39.5 ml/m2. Aortic Valve  The aortic valve appears trileaflet. Mild aortic regurgitation. No aortic valve stenosis. Aorta  Dilation of the aortic root. (4.2cm)   Right Ventricle  Normal right ventricular size and function. TAPSE measures 1.3 cm by objective criteria. RVS velocity= 6.5 cm/sec.    Tricuspid Valve  Tricuspid valve is structurally normal.  There is mild tricuspid regurgitation with the systolic pulmonary artery  pressure (SPAP) estimated at 35 mmHg (estimated RA pressure of 8 mmHg included). Right Atrium  Normal right atrial size. Pulmonic Valve  The pulmonic valve is not well visualized. Mild-to-moderate pulmonic regurgitation present. No stenosis. Pericardial Effusion  There is a mild (1.4cm) pericardial effusion in the dependent portion of the  pericardium. Pleural Effusion  There is a moderate right pleural effusion. Miscellaneous  IVC size is normal (<2.1 cm) and collapses < 50% with respiration consistent  with elevated RA pressure (8 mmHg).   M-Mode/2D Measurements (cm)   LV Diastolic Dimension: 9.28 cm LV Systolic Dimension: 0.14 cm  LV Septum Diastolic: 5.99 cm  LV PW Diastolic: 4.29 cm        AO Root Dimension: 4.25 cm                                  LA Dimension: 3.94 cm                                  LA Area: 22.3 cm2  LVOT: 1.92 cm                   LA volume/Index: 66.1 ml /38 ml/m2  Doppler Measurements   AV Peak Velocity: 129 cm/s     MV Peak E-Wave: 114 cm/s  AV Peak Gradient: 6.66 mmHg    MV Peak A-Wave: 66 cm/s  AV Mean Gradient: 4 mmHg       MV E/A Ratio: 1.73  LVOT Peak Velocity: 62 cm/s    MV P1/2t: 71 msec  AV Area (Continuity):1.5 cm2   TR Velocity:261 cm/s  TR Gradient:27.25 mmHg                                 MV Area (PHT): 3.1 cm2  E' Septal Velocity: 3.99 cm/s  E' Lateral Velocity: 5.43 cm/s  E/E' ratio: 21  PV Peak Velocity: 92.1 cm/s  PV Peak Gradient: 3.39 mmHg   Aortic Valve   Peak Velocity: 129 cm/s    Mean Velocity: 92.8 cm/s  Peak Gradient: 6.66 mmHg   Mean Gradient: 4 mmHg  Area (continuity): 1.5 cm2  AV VTI: 34.3 cm  Aorta   Aortic Root: 4.25 cm  LVOT Diameter: 1.92 cm      CT HEAD WO CONTRAST    Result Date: 8/6/2022  EXAMINATION: ONE XRAY VIEW OF THE CHEST; CT OF THE HEAD WITHOUT CONTRAST 8/6/2022 11:37 am; 8/6/2022 11:54 am COMPARISON: Chest radiograph dated 08/04/2022 TECHNIQUE: CT was performed with dose lowering technique, which can include in the department use of iterative reconstruction technique and modulating CT parameters according to body size. HISTORY: ORDERING SYSTEM PROVIDED HISTORY: palcement of NG TECHNOLOGIST PROVIDED HISTORY: Removed OG and placed NG Reason for exam:->palcement of NG Reason for Exam: placement of NG; ORDERING SYSTEM PROVIDED HISTORY: AMS TECHNOLOGIST PROVIDED HISTORY: Reason for exam:->AMS Has a \"code stroke\" or \"stroke alert\" been called? ->No Reason for Exam: ams FINDINGS: Chest: Patient is rotated. Endotracheal tube extends to the level of the clavicles. Nasogastric tube extends to the left upper quadrant. Cardiac leads project over the chest.  Moderate heterogeneous opacity is seen within the right mid to lower lung zone and dense retrocardiac opacity is seen, similar to prior. Lateral right apical pleuroparenchymal thickening, similar to prior. The apices are incompletely imaged. Head: Generalized cerebral and cerebellar atrophy. Mild periventricular white matter hypodensity, in keeping with small vessel ischemic change. Focal hypodensities at the basal ganglia, in keeping with prior lacunar infarcts, for example at the right caudate head. No mass effect or midline shift. No gross acute hemorrhage. Mucosal thickening of left greater than right maxillary sinus. Nasogastric tube is seen. Postoperative changes of the globes. Chest: Nasogastric tube extends to the abdominal left upper quadrant. No significant interval change in bibasilar airspace disease or lateral right apical pleuroparenchymal thickening as compared to prior. Head: Atrophy and small vessel ischemic disease. XR CHEST PORTABLE    Result Date: 8/9/2022  EXAMINATION: ONE XRAY VIEW OF THE CHEST 8/8/2022 11:37 pm COMPARISON: 08/06/2022 HISTORY: ORDERING SYSTEM PROVIDED HISTORY: PICC placement in R arm. A fiv RVR TECHNOLOGIST PROVIDED HISTORY: Reason for exam:->PICC placement in R arm. A fiv RVR Reason for Exam: PICC placement in R arm. A fiv RVR FINDINGS: The heart is mildly enlarged and unchanged.   The pulmonary vessels are engorged centrally and less prominent. There is hazy perihilar opacity on the left extending inferiorly which is more prominent. There is hazy right basilar opacity which is slightly less prominent. There is an ET tube in place with the tip proximally 3.3 cm above the farheen. There is a gastric tube in place extending into the proximal stomach which is unchanged. There is a PICC line in place on the right with the tip along the cavoatrial junction. There is a small right pleural effusion which is more prominent     Status post right PICC line placement with the tip at the cavoatrial junction. ET tube and gastric tube in good position. Stable mild cardiomegaly with slowly resolving pulmonary edema. Increasing left perihilar opacity extending inferiorly with slowly resolving opacity on the right Small right pleural effusion which is more prominent     XR CHEST PORTABLE    Result Date: 8/6/2022  EXAMINATION: ONE XRAY VIEW OF THE CHEST; CT OF THE HEAD WITHOUT CONTRAST 8/6/2022 11:37 am; 8/6/2022 11:54 am COMPARISON: Chest radiograph dated 08/04/2022 TECHNIQUE: CT was performed with dose lowering technique, which can include in the department use of iterative reconstruction technique and modulating CT parameters according to body size. HISTORY: ORDERING SYSTEM PROVIDED HISTORY: palcement of NG TECHNOLOGIST PROVIDED HISTORY: Removed OG and placed NG Reason for exam:->palcement of NG Reason for Exam: placement of NG; ORDERING SYSTEM PROVIDED HISTORY: AMS TECHNOLOGIST PROVIDED HISTORY: Reason for exam:->AMS Has a \"code stroke\" or \"stroke alert\" been called? ->No Reason for Exam: ams FINDINGS: Chest: Patient is rotated. Endotracheal tube extends to the level of the clavicles. Nasogastric tube extends to the left upper quadrant. Cardiac leads project over the chest.  Moderate heterogeneous opacity is seen within the right mid to lower lung zone and dense retrocardiac opacity is seen, similar to prior.   Lateral right apical pleuroparenchymal thickening, similar to prior. The apices are incompletely imaged. Head: Generalized cerebral and cerebellar atrophy. Mild periventricular white matter hypodensity, in keeping with small vessel ischemic change. Focal hypodensities at the basal ganglia, in keeping with prior lacunar infarcts, for example at the right caudate head. No mass effect or midline shift. No gross acute hemorrhage. Mucosal thickening of left greater than right maxillary sinus. Nasogastric tube is seen. Postoperative changes of the globes. Chest: Nasogastric tube extends to the abdominal left upper quadrant. No significant interval change in bibasilar airspace disease or lateral right apical pleuroparenchymal thickening as compared to prior. Head: Atrophy and small vessel ischemic disease. XR CHEST PORTABLE    Result Date: 8/4/2022  EXAMINATION: ONE XRAY VIEW OF THE CHEST 8/4/2022 7:19 am COMPARISON: Chest x-ray dated 08/04/2022 at 4:42 a.m. HISTORY: ORDERING SYSTEM PROVIDED HISTORY: ETT placement TECHNOLOGIST PROVIDED HISTORY: Reason for exam:->ETT placement Reason for Exam: ETT placement FINDINGS: ET tube terminates above the farheen. Enteric tube tip in the stomach. Worsening bilateral interstitial opacities, right greater than left. Small bilateral pleural effusions. No pneumothorax. Cardiomegaly. Degenerative disease of the visualized osseous structures. Tubes as above. Worsening bilateral interstitial opacities. XR CHEST PORTABLE    Result Date: 8/4/2022  EXAMINATION: ONE XRAY VIEW OF THE CHEST 8/4/2022 4:31 am COMPARISON: No prior for comparison. HISTORY: ORDERING SYSTEM PROVIDED HISTORY: intubated TECHNOLOGIST PROVIDED HISTORY: Reason for exam:->intubated Reason for Exam: intubated FINDINGS: Patient is rotated. The endotracheal tube tip is seen near the thoracic inlet, proximally 7 cm proximal to the farheen. Nasogastric tube extends to the left upper quadrant.   Heterogeneous bilateral pulmonary opacity is seen within the mid to lower lung zones bilaterally, right greater than left. Blunting of the right costophrenic angle. Biapical pleuroparenchymal thickening. Cardiac and mediastinal silhouettes are reflective of patient rotation. Tip of endotracheal tube is high in position, estimated 7 cm proximal to the farheen. Suggest repositioning. Airspace disease within the mid to lower lung zones bilaterally, right greater than left. Small right pleural effusion. Findings were called by the radiology call center. Bronchoscopy    Result Date: 2022  No dictation       Treatments: As above. Disposition: Patient . Discharged Condition:  Patient . Code status:  DNR-CC     Total time spent on discharge, finalizing medications, referrals and arranging outpatient follow up was more than 15 minutes      Thank you Dr. Evans Hawkins MD for the opportunity to be involved in this patients care.

## 2022-08-14 NOTE — PROGRESS NOTES
Patient remains unresponsive. Patient with elevated heart rate and respiratory rate. Rhonci and crackles bilateral. Congested/rattling breathing noted. Yellow sputum suctioned with yankeur. O2 sats remain 40-50%. O2 increased from 2 liters to 4 liters nasal canula. 2 mg morphine IVP given for increased work of breathing. Will continue to monitor.

## 2022-08-14 NOTE — PROGRESS NOTES
: Call placed to on-call group home [facility] at [phone]. No answer, message left. : Call placed to [facility] [email or web address], patient's sister. Unable to reach her. : Call placed to [address] [place] [address] with Charmaine. She will call back with decision. : Call placed to [address] [place] [address] and notified of patients time of death. Body released. : Call back received from Charmaine at 's office. Body released. : Third attmept to call patients sister [facility] [employer] at [phone]. Unable to reach her. : Call back received from LA[facility] Naylor at [facility] group home.  home information obtained. Janie Shrestha states she will notify patient's sister Pepe.

## 2022-08-14 NOTE — PROGRESS NOTES
Asystole on monitor- no blood pressure, heart rate or respirations detected. Dr. Sean Cobian notified.

## 2022-09-05 LAB
FUNGUS (MYCOLOGY) CULTURE: NORMAL
FUNGUS STAIN: NORMAL

## (undated) DEVICE — MAJ-1414 SINGLE USE ADPATER BIOPSY VALV: Brand: SINGLE USE ADAPTOR BIOPSY VALVE

## (undated) DEVICE — SINGLE USE BIOPSY VALVE MAJ-210: Brand: SINGLE USE BIOPSY VALVE (STERILE)

## (undated) DEVICE — TRAP,MUCUS SPECIMEN, 80CC: Brand: MEDLINE

## (undated) DEVICE — SINGLE USE SUCTION VALVE MAJ-209: Brand: SINGLE USE SUCTION VALVE (STERILE)

## (undated) DEVICE — MASK, AEROSOL, ELONGATED, ADULT: Brand: MEDLINE

## (undated) DEVICE — NEBULIZER,KIT,T-MOUTHPIECE,6"RESER,7'TUB: Brand: MEDLINE

## (undated) DEVICE — SYRINGE MED 10ML POLYPR LUERSLIP TIP FLAT TOP W/O SFTY DISP

## (undated) DEVICE — 60 ML SYRINGE REGULAR TIP: Brand: MONOJECT